# Patient Record
Sex: FEMALE | Race: OTHER | Employment: PART TIME | URBAN - METROPOLITAN AREA
[De-identification: names, ages, dates, MRNs, and addresses within clinical notes are randomized per-mention and may not be internally consistent; named-entity substitution may affect disease eponyms.]

---

## 2017-12-02 ENCOUNTER — APPOINTMENT (OUTPATIENT)
Dept: CT IMAGING | Age: 52
End: 2017-12-02
Payer: COMMERCIAL

## 2017-12-02 ENCOUNTER — HOSPITAL ENCOUNTER (EMERGENCY)
Age: 52
Discharge: HOME | End: 2017-12-02
Attending: EMERGENCY MEDICINE
Payer: COMMERCIAL

## 2017-12-02 VITALS
HEIGHT: 64 IN | WEIGHT: 200 LBS | TEMPERATURE: 97.2 F | DIASTOLIC BLOOD PRESSURE: 67 MMHG | HEART RATE: 64 BPM | BODY MASS INDEX: 34.15 KG/M2 | OXYGEN SATURATION: 100 % | RESPIRATION RATE: 16 BRPM | SYSTOLIC BLOOD PRESSURE: 117 MMHG

## 2017-12-02 DIAGNOSIS — R42 VERTIGO: Primary | ICD-10-CM

## 2017-12-02 LAB
ABSOLUTE EOS #: 0.17 K/UL (ref 0–0.44)
ABSOLUTE IMMATURE GRANULOCYTE: <0.03 K/UL (ref 0–0.3)
ABSOLUTE LYMPH #: 1.71 K/UL (ref 1.1–3.7)
ABSOLUTE MONO #: 0.42 K/UL (ref 0.1–1.2)
ALBUMIN SERPL-MCNC: 3.8 G/DL (ref 3.5–5.2)
ALBUMIN/GLOBULIN RATIO: 1.2 (ref 1–2.5)
ALP BLD-CCNC: 82 U/L (ref 35–104)
ALT SERPL-CCNC: 9 U/L (ref 5–33)
ANION GAP SERPL CALCULATED.3IONS-SCNC: 14 MMOL/L (ref 9–17)
AST SERPL-CCNC: 13 U/L
BASOPHILS # BLD: 1 % (ref 0–2)
BASOPHILS ABSOLUTE: 0.05 K/UL (ref 0–0.2)
BILIRUB SERPL-MCNC: 0.39 MG/DL (ref 0.3–1.2)
BUN BLDV-MCNC: 12 MG/DL (ref 6–20)
BUN/CREAT BLD: ABNORMAL (ref 9–20)
CALCIUM SERPL-MCNC: 8.6 MG/DL (ref 8.6–10.4)
CHLORIDE BLD-SCNC: 104 MMOL/L (ref 98–107)
CO2: 22 MMOL/L (ref 20–31)
CREAT SERPL-MCNC: 0.8 MG/DL (ref 0.5–0.9)
DIFFERENTIAL TYPE: ABNORMAL
EKG ATRIAL RATE: 77 BPM
EKG P AXIS: 69 DEGREES
EKG P-R INTERVAL: 116 MS
EKG Q-T INTERVAL: 418 MS
EKG QRS DURATION: 106 MS
EKG QTC CALCULATION (BAZETT): 473 MS
EKG R AXIS: 11 DEGREES
EKG T AXIS: 27 DEGREES
EKG VENTRICULAR RATE: 77 BPM
EOSINOPHILS RELATIVE PERCENT: 2 % (ref 1–4)
GFR AFRICAN AMERICAN: >60 ML/MIN
GFR NON-AFRICAN AMERICAN: >60 ML/MIN
GFR SERPL CREATININE-BSD FRML MDRD: ABNORMAL ML/MIN/{1.73_M2}
GFR SERPL CREATININE-BSD FRML MDRD: ABNORMAL ML/MIN/{1.73_M2}
GLUCOSE BLD-MCNC: 116 MG/DL (ref 70–99)
HCG QUALITATIVE: NEGATIVE
HCT VFR BLD CALC: 40 % (ref 36.3–47.1)
HEMOGLOBIN: 12.4 G/DL (ref 11.9–15.1)
IMMATURE GRANULOCYTES: 0 %
LYMPHOCYTES # BLD: 25 % (ref 24–43)
MCH RBC QN AUTO: 26.8 PG (ref 25.2–33.5)
MCHC RBC AUTO-ENTMCNC: 31 G/DL (ref 28.4–34.8)
MCV RBC AUTO: 86.4 FL (ref 82.6–102.9)
MONOCYTES # BLD: 6 % (ref 3–12)
PDW BLD-RTO: 14.1 % (ref 11.8–14.4)
PLATELET # BLD: 222 K/UL (ref 138–453)
PLATELET ESTIMATE: ABNORMAL
PMV BLD AUTO: 12.2 FL (ref 8.1–13.5)
POC TROPONIN I: 0 NG/ML (ref 0–0.1)
POC TROPONIN INTERP: NORMAL
POTASSIUM SERPL-SCNC: 3.7 MMOL/L (ref 3.7–5.3)
RBC # BLD: 4.63 M/UL (ref 3.95–5.11)
RBC # BLD: ABNORMAL 10*6/UL
SEG NEUTROPHILS: 66 % (ref 36–65)
SEGMENTED NEUTROPHILS ABSOLUTE COUNT: 4.62 K/UL (ref 1.5–8.1)
SODIUM BLD-SCNC: 140 MMOL/L (ref 135–144)
TOTAL PROTEIN: 7 G/DL (ref 6.4–8.3)
WBC # BLD: 7 K/UL (ref 3.5–11.3)
WBC # BLD: ABNORMAL 10*3/UL

## 2017-12-02 PROCEDURE — 96374 THER/PROPH/DIAG INJ IV PUSH: CPT

## 2017-12-02 PROCEDURE — 6370000000 HC RX 637 (ALT 250 FOR IP): Performed by: STUDENT IN AN ORGANIZED HEALTH CARE EDUCATION/TRAINING PROGRAM

## 2017-12-02 PROCEDURE — 6360000004 HC RX CONTRAST MEDICATION: Performed by: STUDENT IN AN ORGANIZED HEALTH CARE EDUCATION/TRAINING PROGRAM

## 2017-12-02 PROCEDURE — 84703 CHORIONIC GONADOTROPIN ASSAY: CPT

## 2017-12-02 PROCEDURE — 85025 COMPLETE CBC W/AUTO DIFF WBC: CPT

## 2017-12-02 PROCEDURE — 70496 CT ANGIOGRAPHY HEAD: CPT

## 2017-12-02 PROCEDURE — 93005 ELECTROCARDIOGRAM TRACING: CPT

## 2017-12-02 PROCEDURE — 99284 EMERGENCY DEPT VISIT MOD MDM: CPT

## 2017-12-02 PROCEDURE — 80053 COMPREHEN METABOLIC PANEL: CPT

## 2017-12-02 PROCEDURE — 84484 ASSAY OF TROPONIN QUANT: CPT

## 2017-12-02 PROCEDURE — 6360000002 HC RX W HCPCS: Performed by: STUDENT IN AN ORGANIZED HEALTH CARE EDUCATION/TRAINING PROGRAM

## 2017-12-02 RX ORDER — DIAZEPAM 5 MG/1
5 TABLET ORAL ONCE
Status: COMPLETED | OUTPATIENT
Start: 2017-12-02 | End: 2017-12-02

## 2017-12-02 RX ORDER — DIAZEPAM 5 MG/1
5 TABLET ORAL EVERY 8 HOURS PRN
Qty: 20 TABLET | Refills: 0 | Status: SHIPPED | OUTPATIENT
Start: 2017-12-02 | End: 2019-04-26

## 2017-12-02 RX ORDER — MECLIZINE HCL 12.5 MG/1
25 TABLET ORAL ONCE
Status: DISCONTINUED | OUTPATIENT
Start: 2017-12-02 | End: 2017-12-02

## 2017-12-02 RX ORDER — ONDANSETRON 4 MG/1
4 TABLET, FILM COATED ORAL ONCE
Status: DISCONTINUED | OUTPATIENT
Start: 2017-12-02 | End: 2017-12-02

## 2017-12-02 RX ORDER — MECLIZINE HCL 12.5 MG/1
25 TABLET ORAL ONCE
Status: COMPLETED | OUTPATIENT
Start: 2017-12-02 | End: 2017-12-02

## 2017-12-02 RX ORDER — ONDANSETRON 2 MG/ML
4 INJECTION INTRAMUSCULAR; INTRAVENOUS ONCE
Status: COMPLETED | OUTPATIENT
Start: 2017-12-02 | End: 2017-12-02

## 2017-12-02 RX ADMIN — DIAZEPAM 5 MG: 5 TABLET ORAL at 12:24

## 2017-12-02 RX ADMIN — IOVERSOL 90 ML: 741 INJECTION INTRA-ARTERIAL; INTRAVENOUS at 10:32

## 2017-12-02 RX ADMIN — ONDANSETRON 4 MG: 2 INJECTION INTRAMUSCULAR; INTRAVENOUS at 10:58

## 2017-12-02 RX ADMIN — MECLIZINE HCL 25 MG: 12.5 TABLET ORAL at 10:58

## 2017-12-02 ASSESSMENT — ENCOUNTER SYMPTOMS
COUGH: 0
SHORTNESS OF BREATH: 0
NAUSEA: 1
VOMITING: 0
ABDOMINAL PAIN: 0
SORE THROAT: 0
PHOTOPHOBIA: 0
BLOOD IN STOOL: 0
SINUS PRESSURE: 0
SINUS PAIN: 0
DIARRHEA: 0

## 2017-12-02 NOTE — ED PROVIDER NOTES
[facility]  ED  Emergency Department Encounter  Emergency Medicine Resident     Pt Name: Torito Collins  MRN: [id number]  Avanigfjustin 1965  Date of evaluation: 12/2/17  PCP:  Allison Reid MD    [address] [place] [address]       Chief Complaint   Patient presents with    Dizziness     Pt reports dizziness x2 days, reports that she feels like she is going to pass out when standing or lying flat with no pillow. Pt denies LOC    Nausea       HISTORY OF PRESENT ILLNESS  (Location/Symptom, Timing/Onset, Context/Setting, Quality, Duration, Modifying Factors, Severity.)      Torito Collins is a 46 y.o. female who presents with Vertigo-type symptoms over the course the past week. Patient reports that she is been intermittently \"dizzy\" for approximately 15 minutes at a time over the course of the past week, particularly when she moves her head or is walking up stairs. She reports that the symptoms come on suddenly and are associated with nausea. She describes the symptoms as \"lightheadedness\" but denies any presyncopal sensation or spots in her vision. She reports that she feels unsteady on her feet and her vision becomes \"shaky. \"  Today, her symptoms came on suddenly shortly after she woke, and they have been constant ever since, which prompted her to present to the emergency department today. Patient denies any significant headache, ear pain, slurred speech, numbness or tingling, weakness, chest pain, shortness of breath, or any other symptoms. Patient has had similar symptoms in the past on multiple occasions as documented in previous ED visit notes. In 2013, CTA revealed basilar artery stenosis, However subsequent imaging with angiography showed no stenosis. Patient follows with neurology throughout 2013 in 2014 and took [facility] for her symptoms. She was symptom-free between 2014 in 2016.   She most recently presented with similar symptoms in April 2016, with negative CTA at that time; Patient was admitted for further evaluation, however she left against medical advice once her symptoms had resolved. Patient reports she has been symptom-free since that time. Patient with history of Factor V Leiden. She reports she was on Coumadin for approximately one year in the past, however she was told by her PCP to stop taking it. She now takes one 81 mg aspirin daily. Patient also has history of iron deficiency anemia and reports that she has not taken her iron in over one year. PAST MEDICAL / SURGICAL / SOCIAL / FAMILY HISTORY      has a past medical history of Arthritis; Basilar artery stenosis; GERD (gastroesophageal reflux disease); Heterozygous factor V Leiden mutation ([facility] [address] Hyperlipidemia; Iron deficiency anemia; Migraine; Obesity (BMI 30-39.9); Panic disorder; and Warfarin anticoagulation. has a past surgical history that includes Cholecystectomy; Tubal ligation; and Carpal tunnel release. Social History     Social History    Marital status: Single     Spouse name: N/A    Number of children: N/A    Years of education: N/A     Occupational History    car industry      Social History Main Topics    Smoking status: Former Smoker    Smokeless tobacco: Never Used    Alcohol use Yes      Comment: rarely    Drug use: No    Sexual activity: Not on file     Other Topics Concern    Not on file     Social History Narrative    No narrative on file       Family History   Problem Relation Age of Onset    Osteoarthritis Mother     Other Mother      colostomy    Cancer Maternal Grandmother      Lung    Diabetes Maternal Grandfather        Allergies:  Review of patient's allergies indicates no known allergies. Home Medications:  Prior to Admission medications    Medication Sig Start Date End Date Taking? Authorizing Provider   diazepam (VALIUM) 5 MG tablet Take 1 tablet by mouth every 8 hours as needed (vertigo)  Do not drive or operate heavy machinery while on this medication. . 12/2/17  Yes Nicholas Mckenzie MD   PARoxetine (PAXIL) 10 MG tablet Take 1 tablet by mouth daily 4/19/16   Barby Harris MD   aspirin 81 MG tablet Take 1 tablet by mouth daily. 5/14/13   Som Kwon MD       REVIEW OF SYSTEMS    (2-9 systems for level 4, 10 or more for level 5)      Review of Systems   Constitutional: Negative for chills, fatigue and fever. HENT: Negative for congestion, dental problem, ear pain, hearing loss, sinus pain, sinus pressure, sore throat and tinnitus. Eyes: Negative for photophobia and visual disturbance. Respiratory: Negative for cough and shortness of breath. Cardiovascular: Negative for chest pain. Gastrointestinal: Positive for nausea. Negative for abdominal pain, blood in stool, diarrhea and vomiting. Genitourinary: Negative for dysuria and hematuria. Musculoskeletal: Positive for gait problem (intermittent, only with vertigo symptoms). Negative for neck pain and neck stiffness. Skin: Negative for pallor and rash. Neurological: Positive for dizziness. Negative for tremors, seizures, syncope, facial asymmetry, speech difficulty, weakness, light-headedness, numbness and headaches. PHYSICAL EXAM   (up to 7 for level 4, 8 or more for level 5)      INITIAL VITALS:   /67   Pulse 64   Temp 97.2 °F (36.2 °C) (Oral)   Resp 16   Ht 5' 4\" (1.626 m)   Wt 200 lb (90.7 kg)   LMP 11/25/2017   SpO2 100%   BMI 34.33 kg/m²     Physical Exam   Gen. Appearance: patient appears well, nondistressed. HEENT: head atraumatic, normocephalic. Pupils equal and reactive to light. Normal tympanic membranes bilaterally. Oropharynx clear and moist.  Neck: Supple, normal range of motion. No lymphadenopathy. Pulmonary: Lungs clear to auscultation bilaterally. Equal air entry right left. Cardiovascular:  Heart sounds normal, no murmurs. Peripheral pulses strong, regular, equal.   Abdomen: Soft, nontender, nondistended. Bowel sounds normal. No palpable masses. Neurology: GCS 15. Oriented to person place and time.    -Cranial nerve exam:                        CN III, IV, VI: EOM normal                        CN V: facial sensation normal                        CN VII: normal facial expressions - symmetrical and normal eyebrow raise, eye closure, smile                        CN IX, X: uvula midline, symmetrical palate motion                        CN XI: normal symmetrical shoulder raise                        CN XII: normal tongue movement; no deviation  -Fatiguable rapid horizontal nystagmus on leftward gaze. No rotary or vertical nystagmus.  -Normal finger-to-nose; no past-pointing  -No dysdiadochokinesis   -Peripheral nerve exam: Normal tone and power in all 4 extremities. No sensory deficits. DIFFERENTIAL  DIAGNOSIS     PLAN (LABS / IMAGING / EKG):  Orders Placed This Encounter   Procedures    CTA HEAD W CONTRAST    CBC Auto Differential    COMPREHENSIVE METABOLIC PANEL    HCG Qualitative, Serum    Orthostatic blood pressure and pulse    POCT troponin    EKG 12 Lead       MEDICATIONS ORDERED:  Orders Placed This Encounter   Medications    DISCONTD: meclizine (ANTIVERT) tablet 25 mg    DISCONTD: ondansetron (ZOFRAN) tablet 4 mg    ioversol (OPTIRAY) 74 % injection 90 mL    meclizine (ANTIVERT) tablet 25 mg    ondansetron (ZOFRAN) injection 4 mg    diazepam (VALIUM) tablet 5 mg    diazepam (VALIUM) 5 MG tablet     Sig: Take 1 tablet by mouth every 8 hours as needed (vertigo)  Do not drive or operate heavy machinery while on this medication. .     Dispense:  20 tablet     Refill:  0       DDX: BPPV, basal artery stenosis, other central or peripheral causes of vertigo, orthostatic hypotension, anemia      DIAGNOSTIC RESULTS / EMERGENCY DEPARTMENT COURSE / MDM     LABS:  Results for orders placed or performed during the hospital encounter of 12/02/17   CBC Auto Differential   Result Value Ref Range    WBC 7.0 3.5 - 11.3 k/uL    RBC 4.63 3.95 -

## 2017-12-02 NOTE — ED NOTES
Pt arrived to ED alert and oriented x4. Pt reports dizziness and nausea x2 days. Pt reports that when she woke up this AM and stretched she felt as if she was going to pass out, denies LOC. Pt reports that she intermittently fells as if she is going to pass out when standing or lying flat with no pillow. Pt reports that she used to take Antivert for her dizziness but reports that she has not taken it \"in years\". Pt ambulates with steady gait. Pt reports nausea d/t the dizziness, denies abdominal pain, v/d/c. Pt placed on cardiac monitor, continuous pulse ox, and BP cuff. EKG in process. RR even and unlabored. NAD noted. Will continue to monitor.       Nay Gomes RN  12/02/17 9600

## 2017-12-04 ENCOUNTER — OFFICE VISIT (OUTPATIENT)
Dept: INTERNAL MEDICINE | Age: 52
End: 2017-12-04
Payer: COMMERCIAL

## 2017-12-04 VITALS
SYSTOLIC BLOOD PRESSURE: 140 MMHG | HEIGHT: 64 IN | DIASTOLIC BLOOD PRESSURE: 86 MMHG | HEART RATE: 86 BPM | BODY MASS INDEX: 36.88 KG/M2 | WEIGHT: 216 LBS

## 2017-12-04 DIAGNOSIS — Z12.39 BREAST CANCER SCREENING: ICD-10-CM

## 2017-12-04 DIAGNOSIS — F41.9 ANXIETY: Chronic | ICD-10-CM

## 2017-12-04 DIAGNOSIS — R42 VERTIGO: ICD-10-CM

## 2017-12-04 DIAGNOSIS — M72.2 PLANTAR FASCIITIS: ICD-10-CM

## 2017-12-04 DIAGNOSIS — Z00.00 HEALTHCARE MAINTENANCE: Primary | ICD-10-CM

## 2017-12-04 DIAGNOSIS — E66.9 OBESITY (BMI 30-39.9): ICD-10-CM

## 2017-12-04 PROCEDURE — G8427 DOCREV CUR MEDS BY ELIG CLIN: HCPCS | Performed by: HOSPITALIST

## 2017-12-04 PROCEDURE — 1036F TOBACCO NON-USER: CPT | Performed by: HOSPITALIST

## 2017-12-04 PROCEDURE — G8417 CALC BMI ABV UP PARAM F/U: HCPCS | Performed by: HOSPITALIST

## 2017-12-04 PROCEDURE — 3014F SCREEN MAMMO DOC REV: CPT | Performed by: HOSPITALIST

## 2017-12-04 PROCEDURE — 3017F COLORECTAL CA SCREEN DOC REV: CPT | Performed by: HOSPITALIST

## 2017-12-04 PROCEDURE — 99213 OFFICE O/P EST LOW 20 MIN: CPT | Performed by: HOSPITALIST

## 2017-12-04 PROCEDURE — G8484 FLU IMMUNIZE NO ADMIN: HCPCS | Performed by: HOSPITALIST

## 2017-12-04 NOTE — PROGRESS NOTES
Attending Physician Statement  I have discussed the care of Grisel Baird, including pertinent history and exam findings with the resident. I have reviewed the key elements of all parts of the encounter and discussed them with the resident. Added history includes hx of vertigo with recurrent episodes. She has sx suggestive of plantar fasciitis with heel pain. She has anxiety and this might relate to her vertigo as well. She is willing to see Dr. Daphne Guevara to discuss this as she does not want to be on meds. Added exam findings include BP is ok ears are clear and balance is ok and no nyustagmus. I agree with the assessment, and status of the problem list as documented. The plan and orders should include continue the Valium prn and exercises for plantar fasciitis and this was also documented by the resident. I agree with the referral to ENT. The medication list was reviewed with the resident and is up to date. The return visit should be in 6 weeks .     Tk Tejeda

## 2017-12-04 NOTE — PROGRESS NOTES
Visit Information    Have you changed or started any medications since your last visit including any over-the-counter medicines, vitamins, or herbal medicines? no   Have you stopped taking any of your medications? Is so, why? -  no  Are you having any side effects from any of your medications? - no    Have you seen any other physician or provider since your last visit? yes    Have you had any other diagnostic tests since your last visit? yes    Have you been seen in the emergency room and/or had an admission in a hospital since we last saw you?  yes - 12/2   Have you had your routine dental cleaning in the past 6 months?  no     Do you have an active MyChart account? If no, what is the barrier?   No: pt declined     Patient Care Team:  Caridad Segal MD as PCP - General (Internal Medicine)    Medical History Review  Past Medical, Family, and Social History reviewed and does contribute to the patient presenting condition    Health Maintenance   Topic Date Due    Hepatitis C screen  1965    HIV screen  05/24/1980    DTaP/Tdap/Td vaccine (1 - Tdap) 05/24/1984    Cervical cancer screen  05/24/1986    Breast cancer screen  05/24/2015    Colon cancer screen colonoscopy  05/24/2015    Flu vaccine (1) 09/01/2017    Diabetes screen  04/19/2019    Lipid screen  04/20/2021

## 2017-12-04 NOTE — PROGRESS NOTES
[facility]  [facility] [address] [facility]  [address] [facility] [address] [facility]  Dept: [phone]  Dept Fax: [phone]    Office Progress/Follow Up Note  Date of patient's visit: 12/4/2017  Patient's Name:  Mirna Ellington YOB: 1965            Patient Care Team:  Berenice Figueroa MD as PCP - General (Internal Medicine)    REASON FOR VISIT: Routine outpatient follow up    HISTORY OF PRESENT ILLNESS:      Chief Complaint   Patient presents with   [facility] ED Follow-up     12/2     Dizziness     all day 12/3, even to just move head around, very naseous feels like motion sickness     Referral - General     would like referral to ENT - pending hears ringing in ear, ear feels muffled     Health Maintenance     mammogram, hiv and hep c labs pending        History was obtained from: patient. Mirna Ellington is a 46 y.o. is here for a follow up. Patient was recently at [facility]. Vs ER for vertigo symptoms. She use to have BPPV and was on meclizine but was stopped as her symptoms improved. Patient went to ER on 12/2 for complaints of dizziness associated with head position changes, nausea, and per ER notes had fatiguable nystagmus. She states on Saturday her symptoms were very severe more so than before. She use to be on meclizine in 2013 and 2014. In ER she received meclizine with little improvement and then was started on valium which she states she took last night and helped with her symptoms. Today she states she is feeling better but is feeling a little nauseous. She has a PMH of possible basilar artery stenosis and was on coumadin but was stopped and is currently on aspirin daily. Orthostatics done today were negative. Patient requests to follow with a ENT specialist. She does not wish to be on too many medications as it may effect her work. She also has a history of anxiety and was previously on xanax and switched to paxil which she has not taken as she does not wish to take it. She would prefer to follow up with someone. She states she has also been having heel pain for almost a year. Denies trauma. Seems pain is worse when she works and is standing still for long periods of times with some relief with activity. More so on left foot. Patient Active Problem List   Diagnosis    Iron deficiency anemia    Heterozygous factor V Leiden mutation ([facility] [address]    Obesity (BMI 30-39. 9)    Warfarin anticoagulation    GERD (gastroesophageal reflux disease)    Anxiety    Dyslipidemia (high LDL; low HDL)    Vertigo         Health Maintenance Due   Topic Date Due    Hepatitis C screen  1965    HIV screen  05/24/1980    DTaP/Tdap/Td vaccine (1 - Tdap) 05/24/1984    Cervical cancer screen  05/24/1986    Breast cancer screen  05/24/2015    Colon cancer screen colonoscopy  05/24/2015    Flu vaccine (1) 09/01/2017         No Known Allergies      MEDICATIONS:      Current Outpatient Prescriptions   Medication Sig Dispense Refill    diazepam (VALIUM) 5 MG tablet Take 1 tablet by mouth every 8 hours as needed (vertigo)  Do not drive or operate heavy machinery while on this medication. . 20 tablet 0    aspirin 81 MG tablet Take 1 tablet by mouth daily. 30 tablet 3    PARoxetine (PAXIL) 10 MG tablet Take 1 tablet by mouth daily 30 tablet 1     No current facility-administered medications for this visit. SOCIAL HISTORY    Reviewed and no change from previous record. Aric Zimmer  reports that she has quit smoking.  She has never used smokeless tobacco.    FAMILY HISTORY:    Reviewed and No change from previous visit    REVIEW OF SYSTEMS:    CONSTITUTIONAL: Denies: fever, chills  PSYCH: Denies: anxiety, depression  ALLERGIES: Denies: urticaria  EYES: Denies: blurry vision, decreased vision, photophobia  ENT: Denies: sore throat, nasal congestion  CARDIOVASCULAR: Denies: chest pain, dyspnea on exertion  RESPIRATORY: Denies: cough, hemoptysis, shortness of breath  GI: Denies: Denies: abdominal pain, flank pain  : Denies: Denies: dysuria, frequency/urgency  NEURO: Denies: dizzy/vertigo, headache  MUSCULOSKELETAL: Denies: back pain, joint pain  SKIN: Denies: rash, itching    PHYSICAL EXAM:      Vitals:    12/04/17 1128 12/04/17 1129 12/04/17 1130   BP: 130/76 126/82 (!) 140/86   Site: Left Arm Left Arm Left Arm   Position: Supine Sitting Standing   Cuff Size: Medium Adult Medium Adult Medium Adult   Pulse: 76 76 86   Weight: 216 lb (98 kg)     Height: 5' 4\" (1.626 m)       BP Readings from Last 3 Encounters:   12/04/17 (!) 140/86   12/02/17 117/67   04/19/16 130/77      General appearance - alert, well appearing, and in no distress  Eyes - pupils equal and reactive, extraocular eye movements intact  Ears - bilateral TM's and external ear canals normal  Chest - clear to auscultation, no wheezes, rales or rhonchi, symmetric air entry  Heart - normal rate, regular rhythm, normal S1, S2, no murmurs, rubs, clicks or gallops  Abdomen - soft, nontender, nondistended, no masses or organomegaly  Neurological - alert, oriented, normal speech, no focal findings or movement disorder noted  Musculoskeletal - no joint tenderness, deformity or swelling  Extremities - peripheral pulses normal, no pedal edema, no clubbing or cyanosis    LABORATORY FINDINGS:    CBC:  Lab Results   Component Value Date    WBC 7.0 12/02/2017    HGB 12.4 12/02/2017     12/02/2017       BMP:    Lab Results   Component Value Date     12/02/2017    K 3.7 12/02/2017     12/02/2017    CO2 22 12/02/2017    BUN 12 12/02/2017    CREATININE 0.80 12/02/2017    GLUCOSE 116 12/02/2017       HEMOGLOBIN A1C:   Lab Results   Component Value Date    LABA1C 5.3 04/19/2016       FASTING LIPID PANEL:  Lab Results   Component Value Date    CHOL 142 04/20/2016    HDL 44 04/20/2016    TRIG 70 04/20/2016       ASSESSMENT AND PLAN:    Anna Gordon was seen today for ed follow-up, dizziness, referral - general and health maintenance.     Diagnoses and all

## 2017-12-04 NOTE — PATIENT INSTRUCTIONS
Patient Education        Plantar Fasciitis: Care Instructions  Your Care Instructions    Plantar fasciitis is pain and inflammation of the plantar fascia, the tissue at the bottom of your foot that connects the heel bone to the toes. The plantar fascia also supports the arch. If you strain the plantar fascia, it can develop small tears and cause heel pain when you stand or walk. Plantar fasciitis can be caused by running or other sports. It also may occur in people who are overweight or who have high arches or flat feet. You may get plantar fasciitis if you walk or stand for long periods, or have a tight Achilles tendon or calf muscles. You can improve your foot pain with rest and other care at home. It might take a few weeks to a few months for your foot to heal completely. Follow-up care is a key part of your treatment and safety. Be sure to make and go to all appointments, and call your doctor if you are having problems. It's also a good idea to know your test results and keep a list of the medicines you take. How can you care for yourself at home? · Rest your feet often. Reduce your activity to a level that lets you avoid pain. If possible, do not run or walk on hard surfaces. · Take pain medicines exactly as directed. ¨ If the doctor gave you a prescription medicine for pain, take it as prescribed. ¨ If you are not taking a prescription pain medicine, take an over-the-counter anti-inflammatory medicine for pain and swelling, such as ibuprofen (Advil, Motrin) or naproxen (Aleve). Read and follow all instructions on the label. · Use ice massage to help with pain and swelling. You can use an ice cube or an ice cup several times a day. To make an ice cup, fill a paper cup with water and freeze it. Cut off the top of the cup until a half-inch of ice shows. Hold onto the remaining paper to use the cup. Rub the ice in small circles over the area for 5 to 7 minutes.   · Contrast baths, which alternate hot and [employer] disclaims any warranty or liability for your use of this information. Order for mammogram faxed to scheduling, they will contact patient with an appt original order given to patient along with scheduling phone number   Referral to    [facility]  was placed, summary of care printed and faxed to office, phone numbers given to pt, they will contact office for an appt   TV    Follow-up appointment scheduled for   01/25/17  , AVS given to patient.

## 2017-12-27 ENCOUNTER — HOSPITAL ENCOUNTER (OUTPATIENT)
Dept: GENERAL RADIOLOGY | Age: 52
Discharge: HOME | End: 2017-12-27
Payer: COMMERCIAL

## 2017-12-27 ENCOUNTER — HOSPITAL ENCOUNTER (OUTPATIENT)
Age: 52
Discharge: HOME | End: 2017-12-27
Payer: COMMERCIAL

## 2017-12-27 ENCOUNTER — OFFICE VISIT (OUTPATIENT)
Dept: PODIATRY | Age: 52
End: 2017-12-27
Payer: COMMERCIAL

## 2017-12-27 VITALS
BODY MASS INDEX: 36.7 KG/M2 | SYSTOLIC BLOOD PRESSURE: 111 MMHG | WEIGHT: 215 LBS | HEIGHT: 64 IN | DIASTOLIC BLOOD PRESSURE: 75 MMHG | TEMPERATURE: 97.6 F | HEART RATE: 68 BPM

## 2017-12-27 DIAGNOSIS — M25.572 CHRONIC PAIN OF LEFT ANKLE: ICD-10-CM

## 2017-12-27 DIAGNOSIS — G89.29 CHRONIC PAIN OF LEFT ANKLE: ICD-10-CM

## 2017-12-27 DIAGNOSIS — M72.2 PLANTAR FASCIITIS: Primary | ICD-10-CM

## 2017-12-27 DIAGNOSIS — M72.2 PLANTAR FASCIITIS OF RIGHT FOOT: ICD-10-CM

## 2017-12-27 DIAGNOSIS — M72.2 PLANTAR FASCIITIS: ICD-10-CM

## 2017-12-27 PROCEDURE — 3014F SCREEN MAMMO DOC REV: CPT | Performed by: STUDENT IN AN ORGANIZED HEALTH CARE EDUCATION/TRAINING PROGRAM

## 2017-12-27 PROCEDURE — 3017F COLORECTAL CA SCREEN DOC REV: CPT | Performed by: STUDENT IN AN ORGANIZED HEALTH CARE EDUCATION/TRAINING PROGRAM

## 2017-12-27 PROCEDURE — 1036F TOBACCO NON-USER: CPT | Performed by: STUDENT IN AN ORGANIZED HEALTH CARE EDUCATION/TRAINING PROGRAM

## 2017-12-27 PROCEDURE — G8417 CALC BMI ABV UP PARAM F/U: HCPCS | Performed by: STUDENT IN AN ORGANIZED HEALTH CARE EDUCATION/TRAINING PROGRAM

## 2017-12-27 PROCEDURE — 99203 OFFICE O/P NEW LOW 30 MIN: CPT | Performed by: STUDENT IN AN ORGANIZED HEALTH CARE EDUCATION/TRAINING PROGRAM

## 2017-12-27 PROCEDURE — 73630 X-RAY EXAM OF FOOT: CPT

## 2017-12-27 PROCEDURE — 73610 X-RAY EXAM OF ANKLE: CPT

## 2017-12-27 PROCEDURE — G8427 DOCREV CUR MEDS BY ELIG CLIN: HCPCS | Performed by: STUDENT IN AN ORGANIZED HEALTH CARE EDUCATION/TRAINING PROGRAM

## 2017-12-27 PROCEDURE — G8484 FLU IMMUNIZE NO ADMIN: HCPCS | Performed by: STUDENT IN AN ORGANIZED HEALTH CARE EDUCATION/TRAINING PROGRAM

## 2017-12-27 PROCEDURE — 29540 STRAPPING ANKLE &/FOOT: CPT | Performed by: STUDENT IN AN ORGANIZED HEALTH CARE EDUCATION/TRAINING PROGRAM

## 2017-12-27 RX ORDER — IBUPROFEN 800 MG/1
800 TABLET ORAL EVERY 6 HOURS PRN
Qty: 60 TABLET | Refills: 0 | Status: SHIPPED | OUTPATIENT
Start: 2017-12-27 | End: 2019-04-26

## 2017-12-27 NOTE — PROGRESS NOTES
Patient instructed to remove shoes and socks, instructed to sit in exam chair. Current PCP name is Fanny Gabriel and date of last visit 11/201. Do you have a follow up visit scheduled?   Yes   02/2018

## 2017-12-27 NOTE — PATIENT INSTRUCTIONS
Buy PowerStep Original Insoles from [employer], remove original insole  Wear shoes inside the home. Stretch before getting out of bed with a towel. Obtain xrays before next visit. Patient Instructions: Plantar Fasciitis    Plantar Fasciitis is inflammation of the long fibrous band on the bottom of the foot (plantar fascia), especially in the area of its attachment to the heel bone (calcaneus). The plantar fascia is also intimately related to the Achilles tendon and therefore stretching of the calf muscles is also important for treatment. 1. Stretching: Perform 3-4 times daily, 2-3 minutes per side      -Before getting out of bed, place a towel around the ball of your foot and       pull back, holding for 30 seconds. Repeat with other foot.      -Place a tennis ball on the floor. Roll the tennis ball under your foot for      10-15 minutes. Repeat with other foot. -Perform calf stretches: stand facing a wall with your hands on the wall,      place one leg a step behind the other. Keeping your back heel on the      floor, bend your front knee (lean into the wall), holding for 30 seconds. Repeat with other leg. 2. Icing: Perform 2-3 times daily      -Place a 12 oz plastic water bottle in the freezer. Once frozen, remove     and roll the bottle under your foot for 10-15 min. 3. Anti-inflammatory Medication      -Begin daily regimen of anti-inflammatory medication (Ibuprofen,  Naproxen, Naprosyn) as discussed during your visit.

## 2017-12-27 NOTE — PROGRESS NOTES
Results   Component Value Date    LABA1C 5.3 04/19/2016       Physical Exam:  General:  Alert and oriented x3. In no acute distress. Lower Extremity Physical Exam:    Vascular: DP and PT pulses are palpable +2/4, Bilateral. CFT <3 seconds to all digits, Bilateral.  No edema, Bilateral.  Hair growth is present to the level of the digits, Bilateral.     Neuro: Saph/sural/SP/DP/plantar SILT. Musculoskeletal: EHL/FHL/GS/TA gross motor intact. Pain on palpation of the medial tubercle of the calcaneus, left. Pain on palpation of the distal fibula, left. Gross deformity is not present, Bilateral.     Dermatologic: Skin is warm, dry, and supple. No open lesions, Bilateral.  Interdigital maceration absent, Bilateral.  Nails 1-10 are normal morphology. Biomechanical Exam:   Right foot: 1st MPJ: 30L 45U  Right foot: 1st Ray: 5D 2P       Right foot: Ankle DF knee extended: -6  Right foot: Ankle DF knee flexed: 4    Left foot: 1st MPJ: 30L 45U  Left foot: 1st Ray: 4D 2P  Left foot: Ankle DF knee extended: -6  Left foot: Ankle DF knee flexed: 4    Gait analysis: Head level, left shoulder lower, right hip elevated, patellae forward, mild collapse of medial arch with weightbearing, resupination of STJ with swing phase, equal purchase of digits. .     Assessment    Gregoria Mims is a 46 y.o. female with:   1. Plantar fasciitis  XR FOOT LEFT (MIN 3 VIEWS)    ibuprofen (ADVIL;MOTRIN) 800 MG tablet    MA STRAPPING; ANKLE &/OR FOOT   2. Chronic pain of left ankle  XR ANKLE LEFT (MIN 3 VIEWS)   3. Plantar fasciitis of right foot  MA STRAPPING; ANKLE &/OR FOOT     Plan      - Patient examined and evaluated  - Diagnosis and treatment options discussed in detail  - Ordered xrays of left foot and ankle. - Rx Ibuprofen 800mg x2 weeks   - Low dye Taping jeremy   - Stretching exercises as instructed   - Buy and wear powersteps in shoes at all times  - Discussed possibility of needing custom orthotics or an injection in the future. - Patient to RTC in 2 month(s)  - Please, call the office with any questions or concerns     Electronically signed by Sarahy Carrera DPM on 12/27/2017 at 2:22 PM

## 2018-01-17 ENCOUNTER — OFFICE VISIT (OUTPATIENT)
Dept: OTOLARYNGOLOGY | Age: 53
End: 2018-01-17
Payer: COMMERCIAL

## 2018-01-17 VITALS
HEART RATE: 74 BPM | WEIGHT: 214.95 LBS | BODY MASS INDEX: 36.7 KG/M2 | SYSTOLIC BLOOD PRESSURE: 128 MMHG | HEIGHT: 64 IN | DIASTOLIC BLOOD PRESSURE: 68 MMHG

## 2018-01-17 DIAGNOSIS — H81.11 BPPV (BENIGN PAROXYSMAL POSITIONAL VERTIGO), RIGHT: Primary | ICD-10-CM

## 2018-01-17 DIAGNOSIS — H93.13 TINNITUS OF BOTH EARS: ICD-10-CM

## 2018-01-17 PROCEDURE — 99203 OFFICE O/P NEW LOW 30 MIN: CPT | Performed by: OTOLARYNGOLOGY

## 2018-01-23 ENCOUNTER — HOSPITAL ENCOUNTER (OUTPATIENT)
Dept: PHYSICAL THERAPY | Age: 53
Setting detail: THERAPIES SERIES
Discharge: HOME | End: 2018-01-23
Payer: COMMERCIAL

## 2018-01-23 PROCEDURE — 97162 PT EVAL MOD COMPLEX 30 MIN: CPT

## 2018-01-23 PROCEDURE — 97530 THERAPEUTIC ACTIVITIES: CPT

## 2018-01-29 ENCOUNTER — HOSPITAL ENCOUNTER (OUTPATIENT)
Dept: PHYSICAL THERAPY | Age: 53
Setting detail: THERAPIES SERIES
Discharge: HOME | End: 2018-01-29
Payer: COMMERCIAL

## 2018-01-29 PROCEDURE — 95992 CANALITH REPOSITIONING PROC: CPT

## 2018-01-29 PROCEDURE — 97530 THERAPEUTIC ACTIVITIES: CPT

## 2018-01-29 ASSESSMENT — PAIN SCALES - GENERAL: PAINLEVEL_OUTOF10: 3

## 2018-01-29 ASSESSMENT — PAIN DESCRIPTION - LOCATION: LOCATION: NECK

## 2018-01-29 NOTE — PROGRESS NOTES
Physical Therapy  INITIAL [facility]      Date: 18  Patient Name: Devon Blue         MRN: [id number]  Account: [de-identified]   : 1965  (46 y.o.) Gender: female      18 1252   General   Additional Pertinent Hx PATIENT REPORTS A SEVERAL YEAR HISTORY OF SYMPTOMS OF UNKNOWN ORIGIN. SHE STATES IN THE PAST SHE WAS DIAGNOSISED WITH BPPV BUT HAS NEVER HAD TREATMENT. THIS MOST RECENT BOUT BEGAN 2017 AND SHE WAS SEEN IN ED.  SHE HAD REPEAT CT SCAN OF HER HEAD WHICH WAS NEGATIVE. SHE FOLLOWED UP WITH ENT AND WAS REFERRED FOR VESTIBULAR REHAB. IMAGING 3/2013 INDICATED POSSIBLE BASILAR ARTERY STENOSIS. REPEAT IMAGING 2013, 2013, 2016 AND 2017 WERE UNREMARKABLE. Past medical history of Arthritis; Basilar artery stenosis; GERD; Heterozygous factor V Leiden mutation; Hyperlipidemia; Iron deficiency anemia; Migraine; Obesity (BMI 30-39.9); Panic disorder; and Warfarin anticoagulation. NO SIGNIFICANT PAST SURGICAL HISTORY   Family / Caregiver Present NO   Referring Practitioner [facility]   Referral Date  18   Diagnosis BPPV   ICD-10:H81.11   PT Visit Information   PT Insurance Information [place]   Total # of Visits to Date 1   Subjective   Subjective PATIENT REPORTS SHE NOTES A SPINNING SENSATION WHEN SHE LIES ON HER RIGHT SIDE LASTING LESS THAN 2 MINUTES. SHE GET ASSOCIATED NAUSEA. Pain Screening   Patient Currently in Pain CERVICAL PAIN \"STIFFNESS\" RATES AT 7/10 ON PAIN SCALE   Vision   Vision GLASSES   Hearing   Hearing PATIENT REPORTS NEW ONSET TINNITUS B EARS   IADL History   Active  Yes   Occupation Full time employment  [employer]   Observation/Palpation   Posture Good   Observation HEAD/NECK IS LATERALLY FLEXED TO THE LEFT.    Spine   Cervical AROM: FLEXION 100%, EXTENSION 50%,   LATERAL FLEXION BOTH 50%, ROTATION BOTH 75%     PATIENT REPORTS CERVICAL PAIN AND STIFFNESS WITH ROM,   DENIES VERTIGO   Special Tests DIZZINESS Assessment RIGHT POSTERIOR CANAL BPPV   Treatment Diagnosis POSITIONAL VERTIGO, ANXIETY   Prognosis Good   Decision Making Medium Complexity   History AS ABOVE, INCLUDES PREVIOUS BOUTS OF VERTIGO, ANXIETY, PANIC ATTACKS   Patient Education RESULTS OF CLINICAL TESTS, POC, MAYBE ABLE TO TOLERATE VERTIGO IF PREMEDICATED PRIOR TO THERAPY NEXT SESSION. ENCOURAGED TO WEAR EAR PROTECTION AT WORK. ISSUED PATIENT EDUCATION LITERATURE RE BPPV   Barriers to Learning NO   Activity Tolerance   Comments VERY ANXIOUS, TEARFUL, UNABLE TO TOLERATE POSITION TEST   Plan   Plan Plan of care initiated   Comment DISCUSSED WITH PATIENT RETURNING WITH FAMILY MEMBER AND TAKING HER MEDICATION FOR VERTIGO PRIOR TO TREATMENT.      Frequency and duration of tx   Days 1   Weeks 3   Therapy Session   TIME IN 1245, TIME OUT 1355  Treatment Charges: Minutes Units   []  Ultrasound     []  Electrical-Stim     []  Iontophoresis     []  Traction     []  Massage       [x]  Eval 45 1   []  Gait     []  Ther Exercise       []  Manual Therapy       [x]  Ther Activities 20  1    []  Aquatics     []  Neuro Re-Ed       []  Other       Total Treatment Time: 65 2    Electronically signed by Merari Espino PT on 1/29/2018 at 8:50 AM

## 2018-01-30 NOTE — PROGRESS NOTES
Minutes Units   []  Ultrasound     []  Electrical-Stim     []  Iontophoresis     []  Traction     []  Massage       []  Eval     []  Gait     []  Ther Exercise       []  Manual Therapy       [x]  Ther Activities 25  2    []  Aquatics     []  Neuro Re-Ed       [x]  Other:CRM 20  1    Total Treatment Time: 39  3   Electronically signed by Sammy Robbins PT on 1/30/2018 at 10:53 AM]

## 2018-01-31 ENCOUNTER — OFFICE VISIT (OUTPATIENT)
Dept: INTERNAL MEDICINE | Age: 53
End: 2018-01-31
Payer: COMMERCIAL

## 2018-01-31 VITALS
SYSTOLIC BLOOD PRESSURE: 113 MMHG | BODY MASS INDEX: 37.05 KG/M2 | TEMPERATURE: 98.6 F | HEART RATE: 78 BPM | HEIGHT: 64 IN | WEIGHT: 217 LBS | OXYGEN SATURATION: 97 % | DIASTOLIC BLOOD PRESSURE: 71 MMHG

## 2018-01-31 DIAGNOSIS — J06.9 VIRAL UPPER RESPIRATORY TRACT INFECTION: Primary | ICD-10-CM

## 2018-01-31 LAB
INFLUENZA A ANTIBODY: NEGATIVE
INFLUENZA B ANTIBODY: NEGATIVE

## 2018-01-31 PROCEDURE — 3017F COLORECTAL CA SCREEN DOC REV: CPT | Performed by: INTERNAL MEDICINE

## 2018-01-31 PROCEDURE — 1036F TOBACCO NON-USER: CPT | Performed by: INTERNAL MEDICINE

## 2018-01-31 PROCEDURE — G8417 CALC BMI ABV UP PARAM F/U: HCPCS | Performed by: INTERNAL MEDICINE

## 2018-01-31 PROCEDURE — G8484 FLU IMMUNIZE NO ADMIN: HCPCS | Performed by: INTERNAL MEDICINE

## 2018-01-31 PROCEDURE — 87804 INFLUENZA ASSAY W/OPTIC: CPT | Performed by: INTERNAL MEDICINE

## 2018-01-31 PROCEDURE — G8427 DOCREV CUR MEDS BY ELIG CLIN: HCPCS | Performed by: INTERNAL MEDICINE

## 2018-01-31 PROCEDURE — 3014F SCREEN MAMMO DOC REV: CPT | Performed by: INTERNAL MEDICINE

## 2018-01-31 PROCEDURE — 99213 OFFICE O/P EST LOW 20 MIN: CPT | Performed by: INTERNAL MEDICINE

## 2018-01-31 ASSESSMENT — PATIENT HEALTH QUESTIONNAIRE - PHQ9
SUM OF ALL RESPONSES TO PHQ9 QUESTIONS 1 & 2: 0
1. LITTLE INTEREST OR PLEASURE IN DOING THINGS: 0
SUM OF ALL RESPONSES TO PHQ QUESTIONS 1-9: 0
2. FEELING DOWN, DEPRESSED OR HOPELESS: 0

## 2018-02-05 ENCOUNTER — HOSPITAL ENCOUNTER (OUTPATIENT)
Dept: PHYSICAL THERAPY | Age: 53
Setting detail: THERAPIES SERIES
Discharge: HOME | End: 2018-02-05
Payer: COMMERCIAL

## 2018-02-07 ENCOUNTER — OFFICE VISIT (OUTPATIENT)
Dept: PODIATRY | Age: 53
End: 2018-02-07
Payer: COMMERCIAL

## 2018-02-07 VITALS
WEIGHT: 212 LBS | HEART RATE: 75 BPM | BODY MASS INDEX: 36.19 KG/M2 | DIASTOLIC BLOOD PRESSURE: 77 MMHG | SYSTOLIC BLOOD PRESSURE: 123 MMHG | HEIGHT: 64 IN

## 2018-02-07 DIAGNOSIS — M79.672 FOOT PAIN, BILATERAL: ICD-10-CM

## 2018-02-07 DIAGNOSIS — M79.671 FOOT PAIN, BILATERAL: ICD-10-CM

## 2018-02-07 DIAGNOSIS — L84 HELOMA MOLLE: ICD-10-CM

## 2018-02-07 DIAGNOSIS — M72.2 PLANTAR FASCIITIS: Primary | ICD-10-CM

## 2018-02-07 PROCEDURE — L3020 FOOT LONGITUD/METATARSAL SUP: HCPCS | Performed by: STUDENT IN AN ORGANIZED HEALTH CARE EDUCATION/TRAINING PROGRAM

## 2018-02-07 PROCEDURE — 11055 PARING/CUTG B9 HYPRKER LES 1: CPT | Performed by: STUDENT IN AN ORGANIZED HEALTH CARE EDUCATION/TRAINING PROGRAM

## 2018-02-07 PROCEDURE — 99213 OFFICE O/P EST LOW 20 MIN: CPT | Performed by: STUDENT IN AN ORGANIZED HEALTH CARE EDUCATION/TRAINING PROGRAM

## 2018-02-07 NOTE — PROGRESS NOTES
[facility] Progress Note    Subjective     Geno Light is a 46y.o. year old female who presents to clinic today for follow-up of bilateral plantar fascitis. Patient states she has been stretching regularly but has not been preforming ice massage, has not gotten athletic shoes or powersteps due to cost. Patient has low dye taping and instructed to use NSAID at last office visit. She reports no change in pain. Patient states the pain has been present for \"quite some time\" and has increasingly gotten worse. Patient admits she is at her heaviest weight currently and spends a lot of time on her feet with her job at [employer]. She relates her pain is worse with weightbearing. Patient denies cramping in her legs at rest or with ambulation, she denies numbness/tingling/burning to LE. She also states her left 5th toe has a painful callus. No other issue and denies n/v/f/c. Primary care physician is Dagmar Parish MD .    ROS: Negative except as noted in the HPI. Denies N/V/F/C/SOB. Objective     Vitals:    02/07/18 1317   BP: 123/77   Pulse: 75       Lab Results   Component Value Date    LABA1C 5.3 04/19/2016       Physical Exam:  General:  Alert and oriented x3. In no acute distress. Lower Extremity Physical Exam:    Vascular: DP and PT pulses are palpable, Bilateral. CFT <3 seconds to all digits, Bilateral.  No edema, Bilateral.  Hair growth is present to the level of the digits, Bilateral.     Neuro: Saph/sural/SP/DP/plantar SILT. Musculoskeletal: EHL/FHL/GS/TA gross motor intact. Pain on palpation of the medial tubercle of the calcaneus and medial plantar fascial band, left. Mild POP of medial calcaneal tubercle, right. Pes planus foot type bilaterally. AJ ROM decreased and pain with dorsiflexion, bilaterally. No palpable dell of achilles or pain on medial-lateral calcaneal squeeze. Gross deformity is absent, Bilateral.     Dermatologic: Skin is warm, dry, and supple.  No open lesions, Bilateral.

## 2018-02-21 ENCOUNTER — HOSPITAL ENCOUNTER (EMERGENCY)
Age: 53
Discharge: HOME | End: 2018-02-21
Attending: EMERGENCY MEDICINE
Payer: COMMERCIAL

## 2018-02-21 ENCOUNTER — APPOINTMENT (OUTPATIENT)
Dept: GENERAL RADIOLOGY | Age: 53
End: 2018-02-21
Payer: COMMERCIAL

## 2018-02-21 VITALS
SYSTOLIC BLOOD PRESSURE: 135 MMHG | RESPIRATION RATE: 19 BRPM | DIASTOLIC BLOOD PRESSURE: 79 MMHG | OXYGEN SATURATION: 97 % | HEART RATE: 76 BPM | TEMPERATURE: 98.7 F

## 2018-02-21 DIAGNOSIS — H81.10 BENIGN PAROXYSMAL POSITIONAL VERTIGO, UNSPECIFIED LATERALITY: ICD-10-CM

## 2018-02-21 DIAGNOSIS — R03.0 ELEVATED BLOOD PRESSURE READING: ICD-10-CM

## 2018-02-21 DIAGNOSIS — H93.13 TINNITUS OF BOTH EARS: Primary | ICD-10-CM

## 2018-02-21 LAB
ABSOLUTE EOS #: 0.14 K/UL (ref 0–0.44)
ABSOLUTE IMMATURE GRANULOCYTE: <0.03 K/UL (ref 0–0.3)
ABSOLUTE LYMPH #: 1.55 K/UL (ref 1.1–3.7)
ABSOLUTE MONO #: 0.36 K/UL (ref 0.1–1.2)
ANION GAP SERPL CALCULATED.3IONS-SCNC: 15 MMOL/L (ref 9–17)
BASOPHILS # BLD: 1 % (ref 0–2)
BASOPHILS ABSOLUTE: 0.06 K/UL (ref 0–0.2)
BUN BLDV-MCNC: 13 MG/DL (ref 6–20)
BUN/CREAT BLD: ABNORMAL (ref 9–20)
CALCIUM SERPL-MCNC: 9.8 MG/DL (ref 8.6–10.4)
CHLORIDE BLD-SCNC: 101 MMOL/L (ref 98–107)
CO2: 24 MMOL/L (ref 20–31)
CREAT SERPL-MCNC: 0.9 MG/DL (ref 0.5–0.9)
DIFFERENTIAL TYPE: ABNORMAL
EKG ATRIAL RATE: 66 BPM
EKG P AXIS: 5 DEGREES
EKG P-R INTERVAL: 116 MS
EKG Q-T INTERVAL: 428 MS
EKG QRS DURATION: 110 MS
EKG QTC CALCULATION (BAZETT): 448 MS
EKG R AXIS: 39 DEGREES
EKG T AXIS: 6 DEGREES
EKG VENTRICULAR RATE: 66 BPM
EOSINOPHILS RELATIVE PERCENT: 2 % (ref 1–4)
GFR AFRICAN AMERICAN: >60 ML/MIN
GFR NON-AFRICAN AMERICAN: >60 ML/MIN
GFR SERPL CREATININE-BSD FRML MDRD: ABNORMAL ML/MIN/{1.73_M2}
GFR SERPL CREATININE-BSD FRML MDRD: ABNORMAL ML/MIN/{1.73_M2}
GLUCOSE BLD-MCNC: 111 MG/DL (ref 70–99)
HCT VFR BLD CALC: 41 % (ref 36.3–47.1)
HEMOGLOBIN: 12.8 G/DL (ref 11.9–15.1)
IMMATURE GRANULOCYTES: 0 %
LYMPHOCYTES # BLD: 23 % (ref 24–43)
MCH RBC QN AUTO: 26.4 PG (ref 25.2–33.5)
MCHC RBC AUTO-ENTMCNC: 31.2 G/DL (ref 28.4–34.8)
MCV RBC AUTO: 84.7 FL (ref 82.6–102.9)
MONOCYTES # BLD: 5 % (ref 3–12)
NRBC AUTOMATED: 0 PER 100 WBC
PDW BLD-RTO: 15.4 % (ref 11.8–14.4)
PLATELET # BLD: 290 K/UL (ref 138–453)
PLATELET ESTIMATE: ABNORMAL
PMV BLD AUTO: 11.9 FL (ref 8.1–13.5)
POC TROPONIN I: 0 NG/ML (ref 0–0.1)
POC TROPONIN I: 2.16 NG/ML (ref 0–0.1)
POC TROPONIN INTERP: ABNORMAL
POC TROPONIN INTERP: NORMAL
POTASSIUM SERPL-SCNC: 4.5 MMOL/L (ref 3.7–5.3)
RBC # BLD: 4.84 M/UL (ref 3.95–5.11)
RBC # BLD: ABNORMAL 10*6/UL
SEG NEUTROPHILS: 69 % (ref 36–65)
SEGMENTED NEUTROPHILS ABSOLUTE COUNT: 4.7 K/UL (ref 1.5–8.1)
SODIUM BLD-SCNC: 140 MMOL/L (ref 135–144)
TROPONIN INTERP: NORMAL
TROPONIN T: <0.03 NG/ML
WBC # BLD: 6.8 K/UL (ref 3.5–11.3)
WBC # BLD: ABNORMAL 10*3/UL

## 2018-02-21 PROCEDURE — 99284 EMERGENCY DEPT VISIT MOD MDM: CPT

## 2018-02-21 PROCEDURE — 80048 BASIC METABOLIC PNL TOTAL CA: CPT

## 2018-02-21 PROCEDURE — 93005 ELECTROCARDIOGRAM TRACING: CPT

## 2018-02-21 PROCEDURE — 84484 ASSAY OF TROPONIN QUANT: CPT

## 2018-02-21 PROCEDURE — 85025 COMPLETE CBC W/AUTO DIFF WBC: CPT

## 2018-02-21 PROCEDURE — 71046 X-RAY EXAM CHEST 2 VIEWS: CPT

## 2018-02-21 PROCEDURE — 6370000000 HC RX 637 (ALT 250 FOR IP): Performed by: PHYSICIAN ASSISTANT

## 2018-02-21 RX ORDER — ASPIRIN 81 MG/1
324 TABLET, CHEWABLE ORAL ONCE
Status: COMPLETED | OUTPATIENT
Start: 2018-02-21 | End: 2018-02-21

## 2018-02-21 RX ORDER — ALPRAZOLAM 0.25 MG/1
0.5 TABLET ORAL ONCE
Status: COMPLETED | OUTPATIENT
Start: 2018-02-21 | End: 2018-02-21

## 2018-02-21 RX ADMIN — ASPIRIN 81 MG 324 MG: 81 TABLET ORAL at 18:28

## 2018-02-21 RX ADMIN — ALPRAZOLAM 0.5 MG: 0.25 TABLET ORAL at 17:48

## 2018-02-21 ASSESSMENT — ENCOUNTER SYMPTOMS
EYE DISCHARGE: 0
EYE ITCHING: 0
NAUSEA: 0
EYE PAIN: 0
BACK PAIN: 0
RHINORRHEA: 0
COLOR CHANGE: 0
SORE THROAT: 0
VOMITING: 0
WHEEZING: 0
COUGH: 0

## 2018-02-21 NOTE — ED PROVIDER NOTES
rebound and no guarding. Musculoskeletal: Normal range of motion. She exhibits no edema. Neurological: She is alert and oriented to person, place, and time. She has normal strength. No cranial nerve deficit or sensory deficit. She displays a negative Romberg sign. Coordination normal. GCS eye subscore is 4. GCS verbal subscore is 5. GCS motor subscore is 6. Heel-to-shin without difficulty   Skin: Skin is warm and dry. No rash (on exposed surfaces) noted. She is not diaphoretic. No pallor. Psychiatric: She has a normal mood and affect. Her behavior is normal.       DIFFERENTIAL  DIAGNOSIS       MI, vertigo, anxiety, PE    PLAN (LABS / IMAGING / EKG):  Orders Placed This Encounter   Procedures    XR CHEST STANDARD (2 VW)    CBC Auto Differential    Basic Metabolic Panel    POCT troponin    POCT troponin    POCT troponin    POCT troponin    EKG 12 Lead       MEDICATIONS ORDERED:  Orders Placed This Encounter   Medications    ALPRAZolam (XANAX) tablet 0.5 mg    aspirin chewable tablet 324 mg    DISCONTD: enoxaparin (LOVENOX) injection 1 mg/kg       Controlled Substances Monitoring:      DIAGNOSTIC RESULTS / EMERGENCY DEPARTMENT COURSE / MDM   Patient reports that her symptoms have completely resolved. She is having some ringing in her ears. Initial point of care troponin was quite elevated at over 2 but repeat point-of-care troponin was 0. Extend down troponin was also not elevated. EKG shows no acute changes. Most likely lab air. Patient to follow-up with her PCP, did recommend using the Valium that she has at home for dizziness until she can get into see her otolaryngologist    Pre-hypertention/Hypertension:  The patient has been informed that they may have pre-hypertension or hypertension based on a blood pressure reading in the emergency Department.   I recommend that the patient call the primary care provider listed on the discharge instructions or physician of their choice this week to arrange follow-up for further evaluation of possible pre-hypertension or hypertension    RADIOLOGY:   I directly visualized (with the attending physician) the following  images and reviewed the radiologist interpretations:  No results found. XR CHEST STANDARD (2 VW)   Final Result   No acute focal airspace consolidation.              LABS:  Results for orders placed or performed during the hospital encounter of 02/21/18   CBC Auto Differential   Result Value Ref Range    WBC 6.8 3.5 - 11.3 k/uL    RBC 4.84 3.95 - 5.11 m/uL    Hemoglobin 12.8 11.9 - 15.1 g/dL    Hematocrit 41.0 36.3 - 47.1 %    MCV 84.7 82.6 - 102.9 fL    MCH 26.4 25.2 - 33.5 pg    MCHC 31.2 28.4 - 34.8 g/dL    RDW 15.4 (H) 11.8 - 14.4 %    Platelets 207 002 - 524 k/uL    MPV 11.9 8.1 - 13.5 fL    NRBC Automated 0.0 0.0 per 100 WBC    Differential Type NOT REPORTED     Seg Neutrophils 69 (H) 36 - 65 %    Lymphocytes 23 (L) 24 - 43 %    Monocytes 5 3 - 12 %    Eosinophils % 2 1 - 4 %    Basophils 1 0 - 2 %    Immature Granulocytes 0 0 %    Segs Absolute 4.70 1.50 - 8.10 k/uL    Absolute Lymph # 1.55 1.10 - 3.70 k/uL    Absolute Mono # 0.36 0.10 - 1.20 k/uL    Absolute Eos # 0.14 0.00 - 0.44 k/uL    Basophils # 0.06 0.00 - 0.20 k/uL    Absolute Immature Granulocyte <0.03 0.00 - 0.30 k/uL    WBC Morphology NOT REPORTED     RBC Morphology ANISOCYTOSIS PRESENT     Platelet Estimate NOT REPORTED    Basic Metabolic Panel   Result Value Ref Range    Glucose 111 (H) 70 - 99 mg/dL    BUN 13 6 - 20 mg/dL    CREATININE 0.90 0.50 - 0.90 mg/dL    Bun/Cre Ratio NOT REPORTED 9 - 20    Calcium 9.8 8.6 - 10.4 mg/dL    Sodium 140 135 - 144 mmol/L    Potassium 4.5 3.7 - 5.3 mmol/L    Chloride 101 98 - 107 mmol/L    CO2 24 20 - 31 mmol/L    Anion Gap 15 9 - 17 mmol/L    GFR Non-African American >60 >60 mL/min    GFR African American >60 >60 mL/min    GFR Comment          GFR Staging NOT REPORTED    Troponin   Result Value Ref Range    Troponin T <0.03 <0.03 ng/mL

## 2018-02-22 NOTE — ED NOTES
Report from [place]. Bossman Baron to update pt and determine plan of discharge.       Meghann Hall RN  02/21/18 1933

## 2018-02-22 NOTE — ED PROVIDER NOTES
SpO2: 97 %    EKG Interpretation    Interpreted by me    Rhythm: normal sinus   Rate: normal  Axis: normal  Ectopy: none  Conduction:  ST Segments: depression anteriorly  T Waves: inversion V2, 3, III  Q Waves: none    Clinical Impression: RBBB, unchanged from previous    This patient is a 46 y.o. Female with Anxiety, dizziness, tenderness, lightheadedness, hot sensation. She has a history of anxiety as well as vertigo considered peripheral as well as tenderness. No known history of Ménière's syndrome. There is no chest pain palpitations shortness of breath leg pain calf swelling immobility. She has a distant history of being on Coumadin for some clot in her brain but no known diagnosis. This was discontinued and her neurologist told her that there may not have ever been a clot. On my exam she appears comfortable vital signs are normal.  No edema cords Homans or calf tenderness. Normal pulses. No nystagmus. Her initial bedside troponin was quite elevated however repeat is normal.  Her EKG is nonspecific right bundle branch block pattern. Plan is fluids, labs, repeat troponin, anxiolytic. Ultimately he would recommend follow-up to ENT for reevaluation and consider Ménière syndrome and assess hearing. OUTSTANDING TASKS / RECOMMENDATIONS:    1. Fluids  2. Labs  3. Repeat troponin  4. Anxiolytics       Narciso Son MD, [place]  Attending Emergency Physician  [facility] ED        Sendy John MD  02/21/18 Emmy Gonzalez

## 2018-03-14 ENCOUNTER — OFFICE VISIT (OUTPATIENT)
Dept: PODIATRY | Age: 53
End: 2018-03-14
Payer: COMMERCIAL

## 2018-03-14 VITALS
HEIGHT: 64 IN | SYSTOLIC BLOOD PRESSURE: 121 MMHG | WEIGHT: 218 LBS | HEART RATE: 68 BPM | BODY MASS INDEX: 37.22 KG/M2 | DIASTOLIC BLOOD PRESSURE: 75 MMHG

## 2018-03-14 DIAGNOSIS — M72.2 PLANTAR FASCIITIS: Primary | ICD-10-CM

## 2018-03-14 DIAGNOSIS — M79.672 FOOT PAIN, BILATERAL: ICD-10-CM

## 2018-03-14 DIAGNOSIS — M79.671 FOOT PAIN, BILATERAL: ICD-10-CM

## 2018-03-14 PROCEDURE — G8484 FLU IMMUNIZE NO ADMIN: HCPCS | Performed by: PODIATRIST

## 2018-03-14 PROCEDURE — G8427 DOCREV CUR MEDS BY ELIG CLIN: HCPCS | Performed by: PODIATRIST

## 2018-03-14 PROCEDURE — 99213 OFFICE O/P EST LOW 20 MIN: CPT | Performed by: PODIATRIST

## 2018-03-14 PROCEDURE — 3014F SCREEN MAMMO DOC REV: CPT | Performed by: PODIATRIST

## 2018-03-14 PROCEDURE — 1036F TOBACCO NON-USER: CPT | Performed by: PODIATRIST

## 2018-03-14 PROCEDURE — G8417 CALC BMI ABV UP PARAM F/U: HCPCS | Performed by: PODIATRIST

## 2018-03-14 PROCEDURE — 3017F COLORECTAL CA SCREEN DOC REV: CPT | Performed by: PODIATRIST

## 2018-03-14 NOTE — PROGRESS NOTES
Patient instructed to remove shoes and socks, instructed to sit in exam chair. Current PCP name is Yamile Krishnamurthy and date of last visit 02/2018. Do you have a follow up visit scheduled?   Yes unknown   `
clinical concern regarding plantar fasciitis, dedicated MR of   the left ankle without contrast would be a more sensitive examination.             Assessment    Angeline Hanks is a 46 y.o. female with:     1. Plantar fasciitis    2. Foot pain, bilateral      Plan      - Patient examined and evaluated  - Diagnosis and treatment options discussed in detail with patient and attending  - Dispensed orthotics and instructed in break in period.   - Continue stretching exercises as instructed and ice massage  --RTC 1 month orthotic check. Possible injection if symptomatic.      Electronically signed by Abdulkadir Bolton DPM on 3/14/2018 at 3:03 PM

## 2018-08-31 ENCOUNTER — OFFICE VISIT (OUTPATIENT)
Dept: INTERNAL MEDICINE | Age: 53
End: 2018-08-31
Payer: MEDICAID

## 2018-08-31 VITALS
HEART RATE: 68 BPM | DIASTOLIC BLOOD PRESSURE: 83 MMHG | WEIGHT: 208.8 LBS | BODY MASS INDEX: 35.65 KG/M2 | HEIGHT: 64 IN | SYSTOLIC BLOOD PRESSURE: 137 MMHG

## 2018-08-31 DIAGNOSIS — Z23 NEED FOR PROPHYLACTIC VACCINATION AND INOCULATION AGAINST VARICELLA: ICD-10-CM

## 2018-08-31 DIAGNOSIS — R42 VERTIGO: Primary | ICD-10-CM

## 2018-08-31 DIAGNOSIS — Z12.31 ENCOUNTER FOR SCREENING MAMMOGRAM FOR BREAST CANCER: ICD-10-CM

## 2018-08-31 PROCEDURE — 99213 OFFICE O/P EST LOW 20 MIN: CPT | Performed by: INTERNAL MEDICINE

## 2018-08-31 PROCEDURE — 99211 OFF/OP EST MAY X REQ PHY/QHP: CPT | Performed by: INTERNAL MEDICINE

## 2018-08-31 ASSESSMENT — ENCOUNTER SYMPTOMS
HEMOPTYSIS: 0
BLURRED VISION: 0
DOUBLE VISION: 0
HEARTBURN: 0
NAUSEA: 0
COUGH: 0
ABDOMINAL PAIN: 0

## 2018-08-31 NOTE — PROGRESS NOTES
137/83   03/14/18 121/75   02/21/18 135/79        Physical Exam   Constitutional: She is oriented to person, place, and time and well-developed, well-nourished, and in no distress. No distress. HENT:   Mouth/Throat: No oropharyngeal exudate. Neck: Normal range of motion. Neck supple. No thyromegaly present. Cardiovascular: Normal rate, regular rhythm, normal heart sounds and intact distal pulses. Pulmonary/Chest: Effort normal and breath sounds normal. No respiratory distress. She has no wheezes. Abdominal: Soft. Bowel sounds are normal. She exhibits no distension and no mass. There is no tenderness. Musculoskeletal: Normal range of motion. She exhibits no edema or tenderness. Neurological: She is alert and oriented to person, place, and time. No cranial nerve deficit. Skin: Skin is warm. No rash noted. She is not diaphoretic. No erythema. Psychiatric: Mood, memory, affect and judgment normal.         DIAGNOSTIC FINDINGS:  CBC:  Lab Results   Component Value Date    WBC 6.8 02/21/2018    HGB 12.8 02/21/2018     02/21/2018       BMP:    Lab Results   Component Value Date     02/21/2018    K 4.5 02/21/2018     02/21/2018    CO2 24 02/21/2018    BUN 13 02/21/2018    CREATININE 0.90 02/21/2018    GLUCOSE 111 02/21/2018       HEMOGLOBIN A1C:   Lab Results   Component Value Date    LABA1C 5.3 04/19/2016       FASTING LIPID PANEL:  Lab Results   Component Value Date    CHOL 142 04/20/2016    HDL 44 04/20/2016    TRIG 70 04/20/2016       ASSESSMENT AND PLAN:  Nay Novoa was seen today for blurred vision and health maintenance. Diagnoses and all orders for this visit:    Vertigo       -    Resolved. No further workup is needed at this time. Encounter for screening mammogram for breast cancer  -     [facility] Digital Screen Bilateral [[id number]];  Future    Need for prophylactic vaccination and inoculation against varicella  -     zoster recombinant adjuvanted vaccine ([address]) 50 MCG SUSR injection; Inject 0.5 mLs into the muscle once for 1 dose 50 MCG IM then repeat 2-6 months. FOLLOW UP AND INSTRUCTIONS:  · Return in about 6 months (around 2/28/2019). · Trinity Kraft received counseling on the following healthy behaviors: nutrition and exercise    · Discussed use, benefit, and side effects of prescribed medications. Barriers to medication compliance addressed. All patient questions answered. Pt voiced understanding. · Patient given educational materials - see patient instructions    Dilshad Cheney MD, CECI, [address] [place] [place] [address] Internal Medicine Associate  8/31/2018, 4:28 PM    This note is created with the assistance of a speech-recognition program. While intending to generate a document that actually reflects the content of the visit, the document can still have some mistakes which may not have been identified and corrected by editing.

## 2018-08-31 NOTE — PATIENT INSTRUCTIONS
You have been given an order and instructions for a mammogram.  The order was faxed to the scheduling department and they will contact you with an appointment. Please bring order with you to that appointment. The scheduling number is [phone] if you have scheduling problems. Please take order for shingrix injection to your pharmacy for administration    Please call for a six month appt for a pap and Summary of Care was reviewed and copy was given to the patient.   FERMIN

## 2018-08-31 NOTE — PROGRESS NOTES
Visit Information    Have you changed or started any medications since your last visit including any over-the-counter medicines, vitamins, or herbal medicines? no   Have you stopped taking any of your medications? Is so, why? -  no  Are you having any side effects from any of your medications? - no    Have you seen any other physician or provider since your last visit? yes - PT   Have you had any other diagnostic tests since your last visit?  no   Have you been seen in the emergency room and/or had an admission in a hospital since we last saw you?  no   Have you had your routine dental cleaning in the past 6 months?  no     Do you have an active MyChart account? If no, what is the barrier?   No:     Patient Care Team:  Sahil Tabares MD as PCP - General (Internal Medicine)    Medical History Review  Past Medical, Family, and Social History reviewed and does not contribute to the patient presenting condition    Health Maintenance   Topic Date Due    Hepatitis C screen  1965    HIV screen  05/24/1980    DTaP/Tdap/Td vaccine (1 - Tdap) 05/24/1984    Cervical cancer screen  05/24/1986    Breast cancer screen  05/24/2015    Shingles Vaccine (1 of 2 - 2 Dose Series) 05/24/2015    Colon cancer screen colonoscopy  05/24/2015    Flu vaccine (1) 09/01/2018    Diabetes screen  04/19/2019    Lipid screen  04/20/2021

## 2018-10-30 NOTE — ED PROVIDER NOTES
[facility]     Emergency Department     Faculty Attestation    I performed a history and physical examination of the patient and discussed management with the resident. I reviewed the residents note and agree with the documented findings and plan of care. Any areas of disagreement are noted on the chart. I was personally present for the key portions of any procedures. I have documented in the chart those procedures where I was not present during the key portions. I have reviewed the emergency nurses triage note. I agree with the chief complaint, past medical history, past surgical history, allergies, medications, social and family history as documented unless otherwise noted below. Documentation of the HPI, Physical Exam and Medical Decision Making performed by medical students or scribes is based on my personal performance of the HPI, PE and MDM. For Physician Assistant/ Nurse Practitioner cases/documentation I have personally evaluated this patient and have completed at least one if not all key elements of the E/M (history, physical exam, and MDM). Additional findings are as noted. Vital signs:   Vitals:    12/02/17 0933   BP: (!) 150/86   Pulse: 76   Resp: 16   Temp: 97.2 °F (36.2 °C)   SpO2: 80       44-year-old female here with vertigo. Possible history of basilar artery stenosis, but subsequent imaging on CTA has shown no stenosis. No other neurologic symptoms. She is nauseated but not vomiting. History of Factor V Leiden, on aspirin and no anticoagulants. Also has history of iron deficiency anemia. Symptoms are exacerbated by movement of her head. On physical exam, she is alert and afebrile. Her TMs appear normal.  She does have exacerbation of symptoms with movement of her head. With fatigable nystagmus in the horizontal plane. No motor deficits or sensory deficits noted.        EKG Interpretation    Interpreted by emergency department physician    Rhythm: normal sinus Cardiac

## 2019-04-26 ENCOUNTER — OFFICE VISIT (OUTPATIENT)
Dept: INTERNAL MEDICINE | Age: 54
End: 2019-04-26
Payer: COMMERCIAL

## 2019-04-26 VITALS
SYSTOLIC BLOOD PRESSURE: 126 MMHG | HEIGHT: 64 IN | WEIGHT: 208 LBS | HEART RATE: 66 BPM | DIASTOLIC BLOOD PRESSURE: 77 MMHG | BODY MASS INDEX: 35.51 KG/M2

## 2019-04-26 DIAGNOSIS — D68.51 HETEROZYGOUS FACTOR V LEIDEN MUTATION (HCC): ICD-10-CM

## 2019-04-26 DIAGNOSIS — F41.9 ANXIETY: ICD-10-CM

## 2019-04-26 DIAGNOSIS — Z12.31 ENCOUNTER FOR SCREENING MAMMOGRAM FOR BREAST CANCER: ICD-10-CM

## 2019-04-26 DIAGNOSIS — Z13.1 ENCOUNTER FOR SCREENING FOR DIABETES MELLITUS: ICD-10-CM

## 2019-04-26 DIAGNOSIS — Z12.11 SCREENING FOR COLON CANCER: ICD-10-CM

## 2019-04-26 DIAGNOSIS — R53.83 FATIGUE, UNSPECIFIED TYPE: Primary | ICD-10-CM

## 2019-04-26 DIAGNOSIS — Z00.00 HEALTH CARE MAINTENANCE: ICD-10-CM

## 2019-04-26 DIAGNOSIS — D64.9 ANEMIA, UNSPECIFIED TYPE: ICD-10-CM

## 2019-04-26 PROCEDURE — 3017F COLORECTAL CA SCREEN DOC REV: CPT | Performed by: INTERNAL MEDICINE

## 2019-04-26 PROCEDURE — 99213 OFFICE O/P EST LOW 20 MIN: CPT | Performed by: INTERNAL MEDICINE

## 2019-04-26 PROCEDURE — G8417 CALC BMI ABV UP PARAM F/U: HCPCS | Performed by: INTERNAL MEDICINE

## 2019-04-26 PROCEDURE — 99211 OFF/OP EST MAY X REQ PHY/QHP: CPT | Performed by: INTERNAL MEDICINE

## 2019-04-26 PROCEDURE — G8427 DOCREV CUR MEDS BY ELIG CLIN: HCPCS | Performed by: INTERNAL MEDICINE

## 2019-04-26 PROCEDURE — 1036F TOBACCO NON-USER: CPT | Performed by: INTERNAL MEDICINE

## 2019-04-26 RX ORDER — IBUPROFEN 600 MG/1
600 TABLET ORAL 3 TIMES DAILY PRN
Qty: 90 TABLET | Refills: 0 | Status: SHIPPED | OUTPATIENT
Start: 2019-04-26 | End: 2019-11-06

## 2019-04-26 RX ORDER — PAROXETINE HYDROCHLORIDE 20 MG/1
20 TABLET, FILM COATED ORAL DAILY
Qty: 30 TABLET | Refills: 3 | Status: SHIPPED | OUTPATIENT
Start: 2019-04-26 | End: 2019-07-26 | Stop reason: SINTOL

## 2019-04-26 ASSESSMENT — PATIENT HEALTH QUESTIONNAIRE - PHQ9
1. LITTLE INTEREST OR PLEASURE IN DOING THINGS: 0
SUM OF ALL RESPONSES TO PHQ QUESTIONS 1-9: 0
SUM OF ALL RESPONSES TO PHQ QUESTIONS 1-9: 0
2. FEELING DOWN, DEPRESSED OR HOPELESS: 0
SUM OF ALL RESPONSES TO PHQ9 QUESTIONS 1 & 2: 0

## 2019-04-26 NOTE — PROGRESS NOTES
Visit Information    Have you changed or started any medications since your last visit including any over-the-counter medicines, vitamins, or herbal medicines? no   Have you stopped taking any of your medications? Is so, why? -  no  Are you having any side effects from any of your medications? - no    Have you seen any other physician or provider since your last visit?  no   Have you had any other diagnostic tests since your last visit?  no   Have you been seen in the emergency room and/or had an admission in a hospital since we last saw you?  no   Have you had your routine dental cleaning in the past 6 months?  no     Do you have an active MyChart account? If no, what is the barrier?   Yes    Patient Care Team:  Lendel Sacks, MD as PCP - General (Internal Medicine)    Medical History Review  Past Medical, Family, and Social History reviewed and does not contribute to the patient presenting condition    Health Maintenance   Topic Date Due    Hepatitis C screen  1965    HIV screen  05/24/1980    DTaP/Tdap/Td vaccine (1 - Tdap) 05/24/1984    Cervical cancer screen  05/24/1986    Breast cancer screen  05/24/2015    Shingles Vaccine (1 of 2) 05/24/2015    Colon cancer screen colonoscopy  05/24/2015    Diabetes screen  04/19/2019    Flu vaccine (Season Ended) 09/01/2019    Lipid screen  04/20/2021    Pneumococcal 0-64 years Vaccine  Aged Out

## 2019-04-26 NOTE — PROGRESS NOTES
[facility]  [facility] [address] [facility]  [address]  Dept: [phone]      Today's Date: 4/26/2019  Patient Name: Giovanni Kenny  Patient's age: 48 y.o., 1965        CHIEF COMPLAINT:    Chief Complaint   Patient presents with    Fatigue     patient thinks she may be menopausal    Health Maintenance     pending, vaccines due, declined       History Obtained From:  patient    HISTORY OF PRESENT ILLNESS:      The patient is a 48 y.o. old  female and is here to follow-up for fatigue. She has been feeling tired lately. She has history of iron deficiency anemia. Denies any acute bleeding. She also has left knee pain which worsens on weightbearing and walking. Denies any trauma. There is no knee swelling or redness reported. No history of fever or chills. She has anxiety for which she is on Paxil. She needs refill. Patient Active Problem List   Diagnosis    Iron deficiency anemia    Heterozygous factor V Leiden mutation ([facility] [address]    Obesity (BMI 30-39. 9)    Warfarin anticoagulation    GERD (gastroesophageal reflux disease)    Anxiety    Dyslipidemia (high LDL; low HDL)    Vertigo       Past Medical History:   has a past medical history of Arthritis, Basilar artery stenosis, GERD (gastroesophageal reflux disease), Heterozygous factor V Leiden mutation ([address] Hyperlipidemia, Iron deficiency anemia, Migraine, Obesity (BMI 30-39.9), Panic disorder, and Warfarin anticoagulation. Past Surgical History:   has a past surgical history that includes Cholecystectomy; Tubal ligation; and Carpal tunnel release. Medications:    Current Outpatient Medications   Medication Sig Dispense Refill    aspirin 81 MG tablet Take 1 tablet by mouth daily.  30 tablet 3    ibuprofen (ADVIL;MOTRIN) 800 MG tablet Take 1 tablet by mouth every 6 hours as needed for Pain 60 tablet 0    diazepam (VALIUM) 5 MG tablet Take 1 tablet by mouth every 8 hours as needed (vertigo)  Do not drive or operate heavy machinery while on this medication. . 20 tablet 0     No current facility-administered medications for this visit. Allergies:  Patient has no known allergies. Social History:   reports that she has quit smoking. She has never used smokeless tobacco. She reports that she drinks alcohol. She reports that she does not use drugs. Family History: family history includes Cancer in her maternal grandmother; Diabetes in her maternal grandfather; Osteoarthritis in her mother; Other in her mother. REVIEW OF SYSTEMS:      Constitutional: Negative for fever and fatigue. HENT: Negative for congestion and sore throat. Eyes: Negative for eye pain and visual disturbance. Respiratory: Negative for chest tightness and shortness of breath. Cardiovascular: Negative for chest pain and orthopnea . Gastrointestinal: Negative for vomiting, abdominal pain, constipation and diarrhea. Endocrine: Negative for cold intolerance, heat intolerance, polydipsia and polyuria. Genitourinary: Negative for dysuria and frequency. Musculoskeletal: Negative for  Myalgia, . Neurological: Negative for dizziness, weakness and headaches. PHYSICAL EXAM:        /77 (Site: Left Upper Arm, Position: Sitting, Cuff Size: Medium Adult)   Pulse 66   Ht 5' 4\" (1.626 m)   Wt 208 lb (94.3 kg)   LMP  (LMP Unknown)   BMI 35.70 kg/m²      General appearance - well appearing, not in  distress. Mental status - alert and cooperative . Head: Normocephalic, without obvious abnormality, atraumatic. Eye: PERRL, conjunctiva/corneas clear, EOM's intact. Neck - Supple, no significant adenopathy . Chest - Clear to auscultation, no wheezes, rales or rhonchi, symmetric air entry. Heart -  regular rhythm, normal S1, S2, no murmurs. Abdomen - Soft, nontender, nondistended, no masses or organomegaly.    Neurological - Alert, oriented, normal speech, no focal findings or movement disorder noted. Musculoskeletal - no bony deformity or tenderness, range of motion full. .   Extremities -  No pedal edema, no clubbing or cyanosis. Labs:       Chemistry        Component Value Date/Time     02/21/2018 1750    K 4.5 02/21/2018 1750     02/21/2018 1750    CO2 24 02/21/2018 1750    BUN 13 02/21/2018 1750    CREATININE 0.90 02/21/2018 1750        Component Value Date/Time    CALCIUM 9.8 02/21/2018 1750    ALKPHOS 82 12/02/2017 1006    AST 13 12/02/2017 1006    ALT 9 12/02/2017 1006    BILITOT 0.39 12/02/2017 1006          No results for input(s): GLUCOSE in the last 72 hours. Lab Results   Component Value Date    WBC 6.8 02/21/2018    HGB 12.8 02/21/2018    HCT 41.0 02/21/2018    MCV 84.7 02/21/2018     02/21/2018     Lab Results   Component Value Date    TSH 1.15 04/20/2016     Lab Results   Component Value Date    LABA1C 5.3 04/19/2016     No results found for: LABMICR, [id number]  No components found for: CHLPL  Lab Results   Component Value Date    TRIG 70 04/20/2016     Lab Results   Component Value Date    HDL 44 04/20/2016     Lab Results   Component Value Date    LDLCHOLESTEROL 84 04/20/2016     The ASCVD Risk score (Carol Ji et al., 2013) failed to calculate for the following reasons:    Cannot find a previous HDL lab    Cannot find a previous total cholesterol lab    Unable to determine if patient is Non-     Health Maintenance Due   Topic Date Due    Hepatitis C screen  1965    HIV screen  05/24/1980    DTaP/Tdap/Td vaccine (1 - Tdap) 05/24/1984    Cervical cancer screen  05/24/1986    Breast cancer screen  05/24/2015    Shingles Vaccine (1 of 2) 05/24/2015    Colon cancer screen colonoscopy  05/24/2015    Diabetes screen  04/19/2019        ASSESSMENT AND PLAN    1. Fatigue, unspecified type    - Comprehensive Metabolic Panel; Future  - Hemoglobin A1C; Future  - TSH with Reflex; Future    2.  Anemia, unspecified type    - CBC Auto Differential; Future    3. Encounter for screening mammogram for breast cancer    - ALBARO Digital Screen Bilateral [[id number]]; Future    4. Screening for colon cancer    - POCT FECAL IMMUNOCHEMICAL TEST (FIT); Future    5. Encounter for screening for diabetes mellitus      6. Health care maintenance    - Lipid Panel; Future    7. Anxiety    - PARoxetine (PAXIL) 20 MG tablet; Take 1 tablet by mouth daily  Dispense: 30 tablet; Refill: 3    8. Heterozygous factor V Leiden mutation ([address]        · Labs as ordered   · Mammogram   · Patient declines colonoscopy however agrees for FIT test.   · Ike Moulton received counseling on the following healthy behaviors: exercise and medication adherence  · Discussed use, benefit, and side effects of prescribed medications. Barriers to medication compliance addressed. All patient questions answered. Pt voiced understanding. · The return visit should be in 4 weeks      Fernando Rodriguez MD  Attending and Faculty Internal Medicine  [facility]  [address]  [address] [place] [address]  Dept: [phone]  4/26/19      This note is created with the assistance of a speech-recognition program. While intending to generate a document that actually reflects the content of the visit, the document can still have some mistakes which may not have been identified and corrected by editing.

## 2019-04-26 NOTE — PATIENT INSTRUCTIONS
Follow-up appointment scheduled for 5/22/19, AVS given to patient. OC-Light hemoccult collection kit given to patient and procedure explained. Your doctor has ordered fasting blood work.   It can be done at [facility] or at the hospital. Rock Ramachandran will need to fast for 12 hours and bring order with you to registration department.    -Order for mammogram faxed to scheduling, they will contact patient with an appt original order given to patient along with scheduling phone number     [facility]

## 2019-05-03 ENCOUNTER — HOSPITAL ENCOUNTER (OUTPATIENT)
Age: 54
Setting detail: SPECIMEN
Discharge: HOME | End: 2019-05-03
Payer: COMMERCIAL

## 2019-05-03 DIAGNOSIS — R53.83 FATIGUE, UNSPECIFIED TYPE: ICD-10-CM

## 2019-05-03 DIAGNOSIS — D64.9 ANEMIA, UNSPECIFIED TYPE: ICD-10-CM

## 2019-05-03 DIAGNOSIS — Z00.00 HEALTH CARE MAINTENANCE: ICD-10-CM

## 2019-05-03 LAB
ABSOLUTE EOS #: 0.13 K/UL (ref 0–0.44)
ABSOLUTE IMMATURE GRANULOCYTE: <0.03 K/UL (ref 0–0.3)
ABSOLUTE LYMPH #: 1.21 K/UL (ref 1.1–3.7)
ABSOLUTE MONO #: 0.42 K/UL (ref 0.1–1.2)
ALBUMIN SERPL-MCNC: 4.1 G/DL (ref 3.5–5.2)
ALBUMIN/GLOBULIN RATIO: 1.3 (ref 1–2.5)
ALP BLD-CCNC: 104 U/L (ref 35–104)
ALT SERPL-CCNC: 30 U/L (ref 5–33)
ANION GAP SERPL CALCULATED.3IONS-SCNC: 11 MMOL/L (ref 9–17)
AST SERPL-CCNC: 28 U/L
BASOPHILS # BLD: 1 % (ref 0–2)
BASOPHILS ABSOLUTE: 0.04 K/UL (ref 0–0.2)
BILIRUB SERPL-MCNC: 0.31 MG/DL (ref 0.3–1.2)
BUN BLDV-MCNC: 14 MG/DL (ref 6–20)
BUN/CREAT BLD: NORMAL (ref 9–20)
CALCIUM SERPL-MCNC: 9.2 MG/DL (ref 8.6–10.4)
CHLORIDE BLD-SCNC: 106 MMOL/L (ref 98–107)
CHOLESTEROL/HDL RATIO: 3.5
CHOLESTEROL: 126 MG/DL
CO2: 24 MMOL/L (ref 20–31)
CREAT SERPL-MCNC: 0.7 MG/DL (ref 0.5–0.9)
DIFFERENTIAL TYPE: ABNORMAL
EOSINOPHILS RELATIVE PERCENT: 2 % (ref 1–4)
ESTIMATED AVERAGE GLUCOSE: 103 MG/DL
GFR AFRICAN AMERICAN: >60 ML/MIN
GFR NON-AFRICAN AMERICAN: >60 ML/MIN
GFR SERPL CREATININE-BSD FRML MDRD: NORMAL ML/MIN/{1.73_M2}
GFR SERPL CREATININE-BSD FRML MDRD: NORMAL ML/MIN/{1.73_M2}
GLUCOSE BLD-MCNC: 91 MG/DL (ref 70–99)
HBA1C MFR BLD: 5.2 % (ref 4–6)
HCT VFR BLD CALC: 46.1 % (ref 36.3–47.1)
HDLC SERPL-MCNC: 36 MG/DL
HEMOGLOBIN: 14.7 G/DL (ref 11.9–15.1)
IMMATURE GRANULOCYTES: 0 %
LDL CHOLESTEROL: 73 MG/DL (ref 0–130)
LYMPHOCYTES # BLD: 19 % (ref 24–43)
MCH RBC QN AUTO: 29.4 PG (ref 25.2–33.5)
MCHC RBC AUTO-ENTMCNC: 31.9 G/DL (ref 28.4–34.8)
MCV RBC AUTO: 92.2 FL (ref 82.6–102.9)
MONOCYTES # BLD: 6 % (ref 3–12)
NRBC AUTOMATED: 0 PER 100 WBC
PDW BLD-RTO: 13.2 % (ref 11.8–14.4)
PLATELET # BLD: 237 K/UL (ref 138–453)
PLATELET ESTIMATE: ABNORMAL
PMV BLD AUTO: 11.7 FL (ref 8.1–13.5)
POTASSIUM SERPL-SCNC: 4.4 MMOL/L (ref 3.7–5.3)
RBC # BLD: 5 M/UL (ref 3.95–5.11)
RBC # BLD: ABNORMAL 10*6/UL
SEG NEUTROPHILS: 72 % (ref 36–65)
SEGMENTED NEUTROPHILS ABSOLUTE COUNT: 4.72 K/UL (ref 1.5–8.1)
SODIUM BLD-SCNC: 141 MMOL/L (ref 135–144)
TOTAL PROTEIN: 7.2 G/DL (ref 6.4–8.3)
TRIGL SERPL-MCNC: 86 MG/DL
TSH SERPL DL<=0.05 MIU/L-ACNC: 1.37 MIU/L (ref 0.3–5)
VLDLC SERPL CALC-MCNC: ABNORMAL MG/DL (ref 1–30)
WBC # BLD: 6.5 K/UL (ref 3.5–11.3)
WBC # BLD: ABNORMAL 10*3/UL

## 2019-05-03 PROCEDURE — 84443 ASSAY THYROID STIM HORMONE: CPT

## 2019-05-03 PROCEDURE — 80053 COMPREHEN METABOLIC PANEL: CPT

## 2019-05-03 PROCEDURE — 80061 LIPID PANEL: CPT

## 2019-05-03 PROCEDURE — 36415 COLL VENOUS BLD VENIPUNCTURE: CPT

## 2019-05-03 PROCEDURE — 85025 COMPLETE CBC W/AUTO DIFF WBC: CPT

## 2019-05-03 PROCEDURE — 83036 HEMOGLOBIN GLYCOSYLATED A1C: CPT

## 2019-05-07 ENCOUNTER — OFFICE VISIT (OUTPATIENT)
Dept: INTERNAL MEDICINE | Age: 54
End: 2019-05-07
Payer: COMMERCIAL

## 2019-05-07 VITALS
HEIGHT: 64 IN | BODY MASS INDEX: 35.34 KG/M2 | WEIGHT: 207 LBS | SYSTOLIC BLOOD PRESSURE: 127 MMHG | DIASTOLIC BLOOD PRESSURE: 76 MMHG | HEART RATE: 65 BPM

## 2019-05-07 DIAGNOSIS — Z00.00 HEALTH CARE MAINTENANCE: ICD-10-CM

## 2019-05-07 DIAGNOSIS — J06.9 VIRAL UPPER RESPIRATORY INFECTION: ICD-10-CM

## 2019-05-07 DIAGNOSIS — L91.8 SKIN TAG: Primary | ICD-10-CM

## 2019-05-07 DIAGNOSIS — M54.31 RIGHT SIDED SCIATICA: ICD-10-CM

## 2019-05-07 PROCEDURE — 3017F COLORECTAL CA SCREEN DOC REV: CPT | Performed by: STUDENT IN AN ORGANIZED HEALTH CARE EDUCATION/TRAINING PROGRAM

## 2019-05-07 PROCEDURE — G8427 DOCREV CUR MEDS BY ELIG CLIN: HCPCS | Performed by: STUDENT IN AN ORGANIZED HEALTH CARE EDUCATION/TRAINING PROGRAM

## 2019-05-07 PROCEDURE — 1036F TOBACCO NON-USER: CPT | Performed by: STUDENT IN AN ORGANIZED HEALTH CARE EDUCATION/TRAINING PROGRAM

## 2019-05-07 PROCEDURE — G8417 CALC BMI ABV UP PARAM F/U: HCPCS | Performed by: STUDENT IN AN ORGANIZED HEALTH CARE EDUCATION/TRAINING PROGRAM

## 2019-05-07 PROCEDURE — 99214 OFFICE O/P EST MOD 30 MIN: CPT | Performed by: STUDENT IN AN ORGANIZED HEALTH CARE EDUCATION/TRAINING PROGRAM

## 2019-05-07 PROCEDURE — 99211 OFF/OP EST MAY X REQ PHY/QHP: CPT | Performed by: INTERNAL MEDICINE

## 2019-05-07 RX ORDER — LORATADINE 10 MG/1
10 TABLET ORAL DAILY
Qty: 7 TABLET | Refills: 0 | Status: SHIPPED | OUTPATIENT
Start: 2019-05-07 | End: 2019-05-14

## 2019-05-07 RX ORDER — GUAIFENESIN/DEXTROMETHORPHAN 100-10MG/5
5 SYRUP ORAL 3 TIMES DAILY PRN
Qty: 120 ML | Refills: 0 | Status: SHIPPED | OUTPATIENT
Start: 2019-05-07 | End: 2019-05-17

## 2019-05-07 RX ORDER — MULTIVIT-MIN/IRON FUM/FOLIC AC 7.5 MG-4
1 TABLET ORAL DAILY
Qty: 30 TABLET | Refills: 3 | Status: SHIPPED | OUTPATIENT
Start: 2019-05-07

## 2019-05-07 RX ORDER — AZITHROMYCIN 500 MG/1
500 TABLET, FILM COATED ORAL DAILY
Qty: 3 TABLET | Refills: 0 | Status: SHIPPED | OUTPATIENT
Start: 2019-05-07 | End: 2019-05-10

## 2019-05-07 NOTE — PROGRESS NOTES
 Breast cancer screen  05/24/2015    Shingles Vaccine (1 of 2) 05/24/2015    Colon cancer screen colonoscopy  05/24/2015       No Known Allergies      Current Outpatient Medications   Medication Sig Dispense Refill    ibuprofen (ADVIL;MOTRIN) 600 MG tablet Take 1 tablet by mouth 3 times daily as needed for Pain 90 tablet 0    aspirin 81 MG tablet Take 1 tablet by mouth daily. 30 tablet 3    PARoxetine (PAXIL) 20 MG tablet Take 1 tablet by mouth daily 30 tablet 3     No current facility-administered medications for this visit.         Social History     Tobacco Use    Smoking status: Former Smoker    Smokeless tobacco: Never Used   Substance Use Topics    Alcohol use: Yes     Comment: rarely    Drug use: No       Family History   Problem Relation Age of Onset    Osteoarthritis Mother     Other Mother         colostomy    Cancer Maternal Grandmother         Lung    Diabetes Maternal Grandfather       ________________________________________________________________________  Review of Systems:  CONSTITUTIONAL: Positive: fever, chills  PSYCH: Denies: anxiety, depression  ALLERGIES: Denies: urticaria  EYES: Denies: blurry vision, decreased vision, photophobia  ENT: Denies: sore throat, nasal congestion  CARDIOVASCULAR: Denies: chest pain, dyspnea on exertion  RESPIRATORY: Denies:  hemoptysis, shortness of breath, positive for productive cough, sore throat  GI: Denies: Denies: abdominal pain, flank pain  : Denies: Denies: dysuria, frequency/urgency  NEURO: Denies: dizzy/vertigo, headache  MUSCULOSKELETAL: positive for joint pain  SKIN: Denies: rash, itching  ________________________________________________________________________  Physical Exam:  Vitals:    05/07/19 1320   BP: 127/76   Site: Left Upper Arm   Position: Sitting   Cuff Size: Medium Adult   Pulse: 65   Weight: 207 lb (93.9 kg)   Height: 5' 4\" (1.626 m)     BP Readings from Last 3 Encounters:   05/07/19 127/76   04/26/19 126/77   08/31/18 137/83      General appearance - alert, well appearing, and in no distress  Mental status - alert, oriented to person, place, and time  Mouth - mucous membranes moist, pharynx normal without lesions  Chest - clear to auscultation, no wheezes, rales or rhonchi, symmetric air entry  Heart - normal rate, regular rhythm, normal S1, S2, no murmurs, rubs, clicks or gallops  Abdomen - soft, nontender, nondistended, no masses or organomegaly  Neurological - alert, oriented, normal speech, no focal findings or movement disorder noted  Extremities - peripheral pulses normal, no pedal edema, no clubbing or cyanosis    ________________________________________________________________________  Diagnostic findings:  CBC:  Lab Results   Component Value Date    WBC 6.5 05/03/2019    HGB 14.7 05/03/2019     05/03/2019       BMP:    Lab Results   Component Value Date     05/03/2019    K 4.4 05/03/2019     05/03/2019    CO2 24 05/03/2019    BUN 14 05/03/2019    CREATININE 0.70 05/03/2019    GLUCOSE 91 05/03/2019       HEMOGLOBIN A1C:   Lab Results   Component Value Date    LABA1C 5.2 05/03/2019       FASTING LIPID PANEL:  Lab Results   Component Value Date    CHOL 126 05/03/2019    HDL 36 (L) 05/03/2019    TRIG 86 05/03/2019     ________________________________________________________________________  Assessment and Plan:  Sudarshan Kapoor was seen today for skin tag and health maintenance. Diagnoses and all orders for this visit:    Skin tag  Referral to dermatology    Viral upper respiratory infection  Azithromycin 500 mg daily for 3 days    Health care maintenance  IFIT for colon Cancer screening    Right sided sciatica  Referral to physical therapy    ________________________________________________________________________  Follow up and instructions:  · No follow-ups on file.     · Sudarshan Parents received counseling on the following healthy behaviors: nutrition, exercise, medication adherence and decrease in alcohol consumption    · Discussed use, benefit, and side effects of prescribed medications. Barriers to medication compliance addressed. All patient questions answered. Pt voiced understanding. · Patient given educational materials - see patient instructions    Joceline Middleton  PGY 2, Internal Medicine   [facility]  5/7/2019, 1:45 PM    This note is created with the assistance of a speech-recognition program. While intending to generate a document that actually reflects the content of the visit, the document can still have some mistakes which may not have been identified and corrected by editing.

## 2019-05-07 NOTE — PROGRESS NOTES
Attending Physician Statement GC  I have discussed the care of Miguel Rodriguez, including pertinent history and exam findings with the resident. I have reviewed the key elements of all parts of the encounter with the resident. I have seen and examined the patient with the resident and the key elements of all parts of the encounter have been performed by me. Skin tags, wants removal-Derm referral  URI symptoms for over 3 weeks, not improving- Aizthromycin, guaifenesin DM, loratadine  Worsening sciatica symptoms- physical therapy referral  Was seen for fatigue last visit, no weight loss, basic w/u neg- will start multivitamin  Does not smoke anymore  Cologuard    Return in about 6 weeks (around 6/18/2019).       Marshal Robles MD

## 2019-07-15 ENCOUNTER — TELEPHONE (OUTPATIENT)
Dept: INTERNAL MEDICINE | Age: 54
End: 2019-07-15

## 2019-07-15 NOTE — LETTER
[address]  [address] [address]  Phone: [phone]  Fax: [phone]    Anjelica Charles MD        July 15, 2019    Amanda Brothers  [address] [place] [id number]      Dear Vickie Olvera: We were unable to reach you by phone. It is important that you contact us by calling [phone], at your earliest convenience. This message is regarding your cologuard test.      If you have any questions or concerns, please don't hesitate to call.     Sincerely,        Anjelica Charles MD

## 2019-07-26 ENCOUNTER — OFFICE VISIT (OUTPATIENT)
Dept: DERMATOLOGY | Age: 54
End: 2019-07-26
Payer: COMMERCIAL

## 2019-07-26 VITALS
HEART RATE: 66 BPM | SYSTOLIC BLOOD PRESSURE: 132 MMHG | WEIGHT: 209 LBS | BODY MASS INDEX: 35.68 KG/M2 | HEIGHT: 64 IN | OXYGEN SATURATION: 97 % | DIASTOLIC BLOOD PRESSURE: 80 MMHG

## 2019-07-26 DIAGNOSIS — L91.8 INFLAMED ACROCHORDON: Primary | ICD-10-CM

## 2019-07-26 DIAGNOSIS — L91.8 ACROCHORDON: ICD-10-CM

## 2019-07-26 PROCEDURE — 11200 RMVL SKIN TAGS UP TO&INC 15: CPT | Performed by: DERMATOLOGY

## 2019-09-27 ENCOUNTER — HOSPITAL ENCOUNTER (OUTPATIENT)
Age: 54
Setting detail: SPECIMEN
Discharge: HOME | End: 2019-09-27
Payer: COMMERCIAL

## 2019-09-27 ENCOUNTER — OFFICE VISIT (OUTPATIENT)
Dept: PRIMARY CARE CLINIC | Age: 54
End: 2019-09-27
Payer: COMMERCIAL

## 2019-09-27 VITALS
BODY MASS INDEX: 36.22 KG/M2 | WEIGHT: 211 LBS | DIASTOLIC BLOOD PRESSURE: 87 MMHG | TEMPERATURE: 97 F | HEART RATE: 71 BPM | SYSTOLIC BLOOD PRESSURE: 129 MMHG | OXYGEN SATURATION: 99 %

## 2019-09-27 DIAGNOSIS — R35.0 URINARY FREQUENCY: ICD-10-CM

## 2019-09-27 DIAGNOSIS — R35.0 URINARY FREQUENCY: Primary | ICD-10-CM

## 2019-09-27 LAB
BILIRUBIN URINE: NEGATIVE
BILIRUBIN, POC: NEGATIVE
BLOOD URINE, POC: NEGATIVE
CLARITY, POC: NORMAL
COLOR, POC: YELLOW
COLOR: YELLOW
COMMENT UA: NORMAL
GLUCOSE URINE, POC: NEGATIVE
GLUCOSE URINE: NEGATIVE
KETONES, POC: NEGATIVE
KETONES, URINE: NEGATIVE
LEUKOCYTE EST, POC: NEGATIVE
LEUKOCYTE ESTERASE, URINE: NEGATIVE
NITRITE, POC: NEGATIVE
NITRITE, URINE: NEGATIVE
PH UA: 5.5 (ref 5–8)
PH, POC: 6
PROTEIN UA: NEGATIVE
PROTEIN, POC: NEGATIVE
SPECIFIC GRAVITY UA: 1.02 (ref 1–1.03)
SPECIFIC GRAVITY, POC: 1.02
TURBIDITY: CLEAR
URINE HGB: NEGATIVE
UROBILINOGEN, POC: 0.2
UROBILINOGEN, URINE: NORMAL

## 2019-09-27 PROCEDURE — 81002 URINALYSIS NONAUTO W/O SCOPE: CPT | Performed by: INTERNAL MEDICINE

## 2019-09-27 PROCEDURE — 99213 OFFICE O/P EST LOW 20 MIN: CPT | Performed by: INTERNAL MEDICINE

## 2019-09-27 PROCEDURE — 3017F COLORECTAL CA SCREEN DOC REV: CPT | Performed by: INTERNAL MEDICINE

## 2019-09-27 PROCEDURE — G8427 DOCREV CUR MEDS BY ELIG CLIN: HCPCS | Performed by: INTERNAL MEDICINE

## 2019-09-27 PROCEDURE — G8417 CALC BMI ABV UP PARAM F/U: HCPCS | Performed by: INTERNAL MEDICINE

## 2019-09-27 PROCEDURE — 1036F TOBACCO NON-USER: CPT | Performed by: INTERNAL MEDICINE

## 2019-09-27 ASSESSMENT — ENCOUNTER SYMPTOMS: NAUSEA: 1

## 2019-11-06 ENCOUNTER — OFFICE VISIT (OUTPATIENT)
Dept: PRIMARY CARE CLINIC | Age: 54
End: 2019-11-06
Payer: COMMERCIAL

## 2019-11-06 ENCOUNTER — HOSPITAL ENCOUNTER (OUTPATIENT)
Age: 54
Discharge: HOME | End: 2019-11-08
Payer: COMMERCIAL

## 2019-11-06 ENCOUNTER — HOSPITAL ENCOUNTER (OUTPATIENT)
Dept: GENERAL RADIOLOGY | Age: 54
Discharge: HOME | End: 2019-11-08
Payer: COMMERCIAL

## 2019-11-06 VITALS
HEART RATE: 76 BPM | WEIGHT: 219 LBS | DIASTOLIC BLOOD PRESSURE: 71 MMHG | TEMPERATURE: 97.5 F | SYSTOLIC BLOOD PRESSURE: 118 MMHG | BODY MASS INDEX: 37.59 KG/M2

## 2019-11-06 DIAGNOSIS — M54.50 ACUTE RIGHT-SIDED LOW BACK PAIN WITHOUT SCIATICA: Primary | ICD-10-CM

## 2019-11-06 DIAGNOSIS — M54.50 ACUTE RIGHT-SIDED LOW BACK PAIN WITHOUT SCIATICA: ICD-10-CM

## 2019-11-06 DIAGNOSIS — R35.0 FREQUENCY OF URINATION: ICD-10-CM

## 2019-11-06 LAB
BILIRUBIN, POC: NEGATIVE
BLOOD URINE, POC: NEGATIVE
CLARITY, POC: CLEAR
COLOR, POC: YELLOW
GLUCOSE URINE, POC: NEGATIVE
KETONES, POC: NEGATIVE
LEUKOCYTE EST, POC: NEGATIVE
NITRITE, POC: NEGATIVE
PH, POC: 6
PROTEIN, POC: NEGATIVE
SPECIFIC GRAVITY, POC: 1.02
UROBILINOGEN, POC: NEGATIVE

## 2019-11-06 PROCEDURE — G8427 DOCREV CUR MEDS BY ELIG CLIN: HCPCS | Performed by: NURSE PRACTITIONER

## 2019-11-06 PROCEDURE — 81002 URINALYSIS NONAUTO W/O SCOPE: CPT | Performed by: NURSE PRACTITIONER

## 2019-11-06 PROCEDURE — G8484 FLU IMMUNIZE NO ADMIN: HCPCS | Performed by: NURSE PRACTITIONER

## 2019-11-06 PROCEDURE — 99202 OFFICE O/P NEW SF 15 MIN: CPT | Performed by: NURSE PRACTITIONER

## 2019-11-06 PROCEDURE — 1036F TOBACCO NON-USER: CPT | Performed by: NURSE PRACTITIONER

## 2019-11-06 PROCEDURE — 3017F COLORECTAL CA SCREEN DOC REV: CPT | Performed by: NURSE PRACTITIONER

## 2019-11-06 PROCEDURE — 72100 X-RAY EXAM L-S SPINE 2/3 VWS: CPT

## 2019-11-06 PROCEDURE — G8417 CALC BMI ABV UP PARAM F/U: HCPCS | Performed by: NURSE PRACTITIONER

## 2019-11-06 RX ORDER — IBUPROFEN 800 MG/1
800 TABLET ORAL EVERY 8 HOURS PRN
Qty: 60 TABLET | Refills: 0 | Status: SHIPPED | OUTPATIENT
Start: 2019-11-06 | End: 2020-12-08

## 2019-11-06 RX ORDER — CYCLOBENZAPRINE HCL 5 MG
5 TABLET ORAL NIGHTLY PRN
Qty: 20 TABLET | Refills: 0 | Status: SHIPPED | OUTPATIENT
Start: 2019-11-06 | End: 2019-11-16

## 2019-11-06 ASSESSMENT — ENCOUNTER SYMPTOMS
NAUSEA: 0
SHORTNESS OF BREATH: 0
VOMITING: 0
BACK PAIN: 1
ABDOMINAL PAIN: 0
DIARRHEA: 0
COUGH: 0
SORE THROAT: 0
SINUS PAIN: 0

## 2020-12-08 ENCOUNTER — TELEPHONE (OUTPATIENT)
Dept: INTERNAL MEDICINE | Age: 55
End: 2020-12-08

## 2020-12-08 ENCOUNTER — OFFICE VISIT (OUTPATIENT)
Dept: INTERNAL MEDICINE | Age: 55
End: 2020-12-08
Payer: COMMERCIAL

## 2020-12-08 ENCOUNTER — NURSE TRIAGE (OUTPATIENT)
Dept: OTHER | Facility: CLINIC | Age: 55
End: 2020-12-08

## 2020-12-08 VITALS
HEART RATE: 79 BPM | DIASTOLIC BLOOD PRESSURE: 71 MMHG | WEIGHT: 221 LBS | SYSTOLIC BLOOD PRESSURE: 138 MMHG | HEIGHT: 64 IN | BODY MASS INDEX: 37.73 KG/M2

## 2020-12-08 PROCEDURE — 99211 OFF/OP EST MAY X REQ PHY/QHP: CPT | Performed by: INTERNAL MEDICINE

## 2020-12-08 PROCEDURE — G8427 DOCREV CUR MEDS BY ELIG CLIN: HCPCS | Performed by: STUDENT IN AN ORGANIZED HEALTH CARE EDUCATION/TRAINING PROGRAM

## 2020-12-08 PROCEDURE — 1036F TOBACCO NON-USER: CPT | Performed by: STUDENT IN AN ORGANIZED HEALTH CARE EDUCATION/TRAINING PROGRAM

## 2020-12-08 PROCEDURE — 99213 OFFICE O/P EST LOW 20 MIN: CPT | Performed by: STUDENT IN AN ORGANIZED HEALTH CARE EDUCATION/TRAINING PROGRAM

## 2020-12-08 PROCEDURE — G8484 FLU IMMUNIZE NO ADMIN: HCPCS | Performed by: STUDENT IN AN ORGANIZED HEALTH CARE EDUCATION/TRAINING PROGRAM

## 2020-12-08 PROCEDURE — G8417 CALC BMI ABV UP PARAM F/U: HCPCS | Performed by: STUDENT IN AN ORGANIZED HEALTH CARE EDUCATION/TRAINING PROGRAM

## 2020-12-08 PROCEDURE — 3017F COLORECTAL CA SCREEN DOC REV: CPT | Performed by: STUDENT IN AN ORGANIZED HEALTH CARE EDUCATION/TRAINING PROGRAM

## 2020-12-08 RX ORDER — MECLIZINE HCL 12.5 MG/1
12.5 TABLET ORAL 3 TIMES DAILY PRN
Qty: 15 TABLET | Refills: 1 | Status: SHIPPED | OUTPATIENT
Start: 2020-12-08 | End: 2020-12-18

## 2020-12-08 ASSESSMENT — PATIENT HEALTH QUESTIONNAIRE - PHQ9
SUM OF ALL RESPONSES TO PHQ9 QUESTIONS 1 & 2: 0
2. FEELING DOWN, DEPRESSED OR HOPELESS: 0
SUM OF ALL RESPONSES TO PHQ QUESTIONS 1-9: 0
SUM OF ALL RESPONSES TO PHQ QUESTIONS 1-9: 0
1. LITTLE INTEREST OR PLEASURE IN DOING THINGS: 0
SUM OF ALL RESPONSES TO PHQ QUESTIONS 1-9: 0

## 2020-12-08 NOTE — PROGRESS NOTES
[facility]    Progress Note    Date of patient's visit: 12/8/2020  YOB: 1965  Patient's Name:  Brian Ramos    Patient Care Team:  Soraya Mcwilliams MD as PCP - General (Internal Medicine)    REASON FOR VISIT: Routine outpatient follow     HISTORY OF PRESENT ILLNESS:    History was obtained from the patient. Brian Ramos is a 54 y.o. is here for same-day visit for vertigo. Patient states that she had issues with vertigo and dizziness in the past and went for therapy which helped her vertigo. Today she was lying on her right side on the sofa and when she got up she suddenly started feeling dizzy. She has issues on the same side previously as well. Patient does not report any noticeable hearing loss but did state that her family notices that she has a hard time understanding what they say. She also reports occasional ringing in the ear. She has not had a hearing test in a while. Patient is agreeable for ENT follow up. No history of cough, shortness of breath, upper respiratory symptoms. Patient is otherwise doing well. Blood pressure 120/86. Patient is not on any blood pressure medications. Former smoker  Declined all health maintenance today. Patient Active Problem List   Diagnosis    Iron deficiency anemia    Heterozygous factor V Leiden mutation ([facility] [address]    Obesity (BMI 30-39. 9)    Warfarin anticoagulation    GERD (gastroesophageal reflux disease)    Anxiety    Dyslipidemia (high LDL; low HDL)    Vertigo       ALLERGIES    No Known Allergies      MEDICATIONS:      Current Outpatient Medications on File Prior to Visit   Medication Sig Dispense Refill    Multiple Vitamins-Minerals (MULTIVITAMIN WITH MINERALS) tablet Take 1 tablet by mouth daily 30 tablet 3    aspirin 81 MG tablet Take 1 tablet by mouth daily. 30 tablet 3     No current facility-administered medications on file prior to visit.       SOCIAL HISTORY    Reviewed and no change from previous record. Irma Malou  reports that she quit smoking about 30 years ago. Her smoking use included cigarettes. She smoked 0.25 packs per day. She has never used smokeless tobacco.    FAMILY HISTORY:    Reviewed and No change from previous visit    REVIEW OF SYSTEMS:    ENT:positive for hearing loss  Respiratory: no cough, shortness of breath, or wheezing  Cardiovascular: no chest pain or dyspnea on exertion  Gastrointestinal: no abdominal pain, black or bloody stools  Neurological: no TIA or stroke symptoms  Endocrine: negative  Genito-Urinary: no dysuria, trouble voiding, or hematuria  Musculoskeletal: negative  Dermatological: negative    PHYSICAL EXAM:      Vitals:    12/08/20 1322   BP: 138/71   Pulse: 79     Physical Exam   Constitutional: She appears well-developed. HENT:   Head: Normocephalic and atraumatic. Minimal wax both ears  epley meneuver positive on rt side  Dizziness reproducible   Eyes: Pupils are equal, round, and reactive to light. Neck: Normal range of motion. Cardiovascular: Normal rate and regular rhythm. Musculoskeletal: Normal range of motion.        LABORATORY FINDINGS:    CBC:  Lab Results   Component Value Date    WBC 6.5 05/03/2019    HGB 14.7 05/03/2019     05/03/2019     BMP:    Lab Results   Component Value Date     05/03/2019    K 4.4 05/03/2019     05/03/2019    CO2 24 05/03/2019    BUN 14 05/03/2019    CREATININE 0.70 05/03/2019    GLUCOSE 91 05/03/2019     HEMOGLOBIN A1C:   Lab Results   Component Value Date    LABA1C 5.2 05/03/2019     MICROALBUMIN URINE: No results found for: MICROALBUR  FASTING LIPID PANEL:  Lab Results   Component Value Date    CHOL 126 05/03/2019    HDL 36 (L) 05/03/2019    TRIG 86 05/03/2019     LIVER PROFILE:  Lab Results   Component Value Date    ALT 30 05/03/2019    AST 28 05/03/2019    PROT 7.2 05/03/2019    BILITOT 0.31 05/03/2019    BILIDIR 0.07 03/25/2013    LABALBU 4.1 05/03/2019      THYROID FUNCTION:   Lab Results   Component Value Date    TSH 1.37 05/03/2019      URINE ANALYSIS: No results found for: LABURIN  ASSESSMENT AND PLAN:    1. BPV (benign positional vertigo), right    - AFL - Ingris Huntley MD, Otolaryngology, [facility]  - meclizine (ANTIVERT) 12.5 MG tablet; Take 1 tablet by mouth 3 times daily as needed for Dizziness  Dispense: 15 tablet; Refill: 1  - [place]    2. Heterozygous factor V Leiden mutation ([facility] [address]  stable  Not on anticoagulation    3. Tinnitus of both ears  Patient will need hearing test      FOLLOW UP:       1. Hannah Cespedes received counseling on the following healthy behaviors: exercise and medication adherence  2. Patient given educational materials - see patient instructions  3. Discussed use, benefit, and side effects of prescribed medications. Barriers to medication compliance addressed. All patient questions answered. Pt voiced understanding. 4.  Reviewed prior labs and health maintenance  5. Continue current medications, diet and exercise.     Pradip Wilson MD  INTERNAL MEDICINE RESIDENT, PGY-2  [address] [place], [place]  12/8/2020,1:49 PM

## 2020-12-08 NOTE — TELEPHONE ENCOUNTER
Reason for Disposition   Patient wants to be seen    Answer Assessment - Initial Assessment Questions  1. DESCRIPTION: \"Describe your dizziness. \"      Just really dizzy when I move    2. LIGHTHEADED: \"Do you feel lightheaded? \" (e.g., somewhat faint, woozy, weak upon standing)  Yes    3. VERTIGO: \"Do you feel like either you or the room is spinning or tilting? \" (i.e. vertigo)    No    4. SEVERITY: \"How bad is it? \"  \"Do you feel like you are going to faint? \" \"Can you stand and walk? \"    - MILD - walking normally    - MODERATE - interferes with normal activities (e.g., work, school)     - SEVERE - unable to stand, requires support to walk, feels like passing out now.    mild    5. ONSET:  \"When did the dizziness begin? \"  1 hour ago    6. AGGRAVATING FACTORS: \"Does anything make it worse? \" (e.g., standing, change in head position)   fast movement    7. HEART RATE: \"Can you tell me your heart rate? \" \"How many beats in 15 seconds? \"  (Note: not all patients can do this)     no    8. CAUSE: \"What do you think is causing the dizziness? \"    The way she slept, slept on right side    9. RECURRENT SYMPTOM: \"Have you had dizziness before? \" If so, ask: \"When was the last time? \" \"What happened that time? \"  Yes, had vertigo a few years ago and has seen a therapist for it    10. OTHER SYMPTOMS: \"Do you have any other symptoms? \" (e.g., fever, chest pain, vomiting, diarrhea, bleeding)    Nausea    11. PREGNANCY: \"Is there any chance you are pregnant? \" \"When was your last menstrual period? \"    no    Protocols used: DIZZINESS-ADULT-OH  Patient called pre-service [facility] with red flag complaint. Brief description of triage: dizziness, see assessment. Triage indicates for patient to be seen today. Care advice provided, patient verbalizes understanding; denies any other questions or concerns; instructed to call back for any new or worsening symptoms. Message send to . Attention Provider:  Thank you for allowing me to participate in the care of your patient. The patient was connected to triage in response to information provided to the ECC. Please do not respond through this encounter as the response is not directed to a shared pool.

## 2020-12-08 NOTE — PATIENT INSTRUCTIONS
tinnitus. If you can't avoid loud noises, wear earplugs or earmuffs. · Ignore the sound by paying attention to other things. · Relax using biofeedback, meditation, or yoga. Feeling stressed and being tired can make tinnitus worse. · Play music or white noise to help you sleep. Background noise may cover up the noise that you hear in your ears. You can buy a machine that makes soothing sounds, such as ocean waves. When should you call for help? Call 911 anytime you think you may need emergency care. For example, call if:    · You have symptoms of a stroke. These may include:  ? Sudden numbness, tingling, weakness, or loss of movement in your face, arm, or leg, especially on only one side of your body. ? Sudden vision changes. ? Sudden trouble speaking. ? Sudden confusion or trouble understanding simple statements. ? Sudden problems with walking or balance. ? A sudden, severe headache that is different from past headaches. Call your doctor now or seek immediate medical care if:    · You develop other symptoms. These may include hearing loss (or worse hearing loss), balance problems, dizziness, nausea, or vomiting. Watch closely for changes in your health, and be sure to contact your doctor if:    · Your tinnitus moves from both ears to one ear.     · Your hearing loss gets worse within 1 day after an ear injury.     · Your tinnitus or hearing loss does not get better within 1 week after an ear injury.     · Your tinnitus bothers you enough that you want to take medicines to help you cope with it. Where can you learn more? Go to https://Fidelis Security Systemscindy.LimeSpot Solutions. org and sign in to your Shutter Guardian account. Enter S165 in the [employer] box to learn more about \"Tinnitus: Care Instructions. \"     If you do not have an account, please click on the \"Sign Up Now\" link. Current as of: April 15, 2020               Content Version: 12.6  © 5065-5954 [employer]ap, Incorporated.    Care instructions adapted under license by [facility] ([facility]). If you have questions about a medical condition or this instruction, always ask your healthcare professional. [address] any warranty or liability for your use of this information. Patient Education        Benign Paroxysmal Positional Vertigo (BPPV): Care Instructions  Your Care Instructions     Benign paroxysmal positional vertigo, also called BPPV, is an inner ear problem. It causes a spinning or whirling sensation when you move your head. This sensation is called vertigo. The vertigo usually lasts for less than a minute. People often have vertigo spells for a few days or weeks. Then the vertigo goes away. But it may come back again. The vertigo may be mild, or it may be bad enough to cause unsteadiness, nausea, and vomiting. When you move, your inner ear sends messages to the brain. This helps you keep your balance. Vertigo can happen when debris builds up in the inner ear. The buildup can cause the inner ear to send the wrong message to the brain. Your doctor may move you in different positions to help your vertigo get better faster. This is called the Epley maneuver. Your doctor may also prescribe medicines or exercises to help with your symptoms. Follow-up care is a key part of your treatment and safety. Be sure to make and go to all appointments, and call your doctor if you are having problems. It's also a good idea to know your test results and keep a list of the medicines you take. How can you care for yourself at home? · If your doctor suggests that you do Bolnaos-Daroff exercises:  ? Sit on the edge of a bed or sofa. Quickly lie down on the side that causes the worst vertigo. Lie on your side with your ear down. ? Stay in this position for at least 30 seconds or until the vertigo goes away. ? Sit up. If this causes vertigo, wait for it to stop. ? Repeat the procedure on the other side. ? Repeat this 10 times.  Do these exercises 2 times a day until the vertigo is gone. When should you call for help? Call 911 anytime you think you may need emergency care. For example, call if:    · You have symptoms of a stroke. These may include:  ? Sudden numbness, tingling, weakness, or loss of movement in your face, arm, or leg, especially on only one side of your body. ? Sudden vision changes. ? Sudden trouble speaking. ? Sudden confusion or trouble understanding simple statements. ? Sudden problems with walking or balance. ? A sudden, severe headache that is different from past headaches. Call your doctor now or seek immediate medical care if:    · You have new or worse nausea and vomiting.     · You have new symptoms such as hearing loss or roaring in your ears. Watch closely for changes in your health, and be sure to contact your doctor if:    · You are not getting better as expected.     · Your vertigo gets worse. Where can you learn more? Go to https://Cause.it.Nexavis. org and sign in to your Reading Rainbow account. Enter  in the CEYX box to learn more about \"Benign Paroxysmal Positional Vertigo (BPPV): Care Instructions. \"     If you do not have an account, please click on the \"Sign Up Now\" link. Current as of: April 15, 2020               Content Version: 12.6  © 9754-8194 [employer], Incorporated. Care instructions adapted under license by [facility] ([facility]). If you have questions about a medical condition or this instruction, always ask your healthcare professional. [place] [address] any warranty or liability for your use of this information. Medications e-scribe to pharmacy of pt's choice. Referral to OT and otolaryngology  was placed, summary of care printed and faxed to office, phone numbers given to the patient, they will contact office for an appt    Return To Clinic 1/25/2021. After Visit Summary  given and reviewed.      It is very important for your care that you keep your appointment. If for some reason you are unable to keep your appointment it is equally important that you call our office at [phone] to cancel your appointment and reschedule. Failure to do so may result in your termination from our practice.     SL

## 2020-12-10 ENCOUNTER — TELEPHONE (OUTPATIENT)
Dept: INTERNAL MEDICINE | Age: 55
End: 2020-12-10

## 2020-12-10 NOTE — TELEPHONE ENCOUNTER
PC from Allison Franco from [id number] - states patient was referred to them for vertigo but order was placed for occupational therapy and needs to be for physical therapy. Order pended.

## 2020-12-11 ENCOUNTER — HOSPITAL ENCOUNTER (OUTPATIENT)
Dept: PHYSICAL THERAPY | Facility: CLINIC | Age: 55
Setting detail: THERAPIES SERIES
Discharge: HOME | End: 2020-12-11
Payer: COMMERCIAL

## 2020-12-11 PROCEDURE — 97110 THERAPEUTIC EXERCISES: CPT

## 2020-12-11 PROCEDURE — 97161 PT EVAL LOW COMPLEX 20 MIN: CPT

## 2020-12-11 NOTE — CONSULTS
[] [address]  [address]  [phone]  F: [phone] [x] [address]  [facility]   [address]  P: [phone]  F: [phone] [] [place]  [address] [facility]  P: [phone]  F: [phone] [] [place]  [address]  [address]  P: [phone]  F: [phone]     [] [address]  [facility]   [address]  [place]: [phone]  F: [phone]     Physical Therapy Vestibular Rehab Evaluation    Date:  2020  Patient: Priya Park  : 1965  MRN: [id number]  Physician: Igor Khanna MD  Insurance: [facility]  Medical Diagnosis: H81.11- benign paroxysmal positional vertigo, right  Rehab Codes: H81.11  Onset date: 2020  Next Dr's appt. : 2021    Subjective:   CC:     \"Sometimes when I look down then up- that's it- the room started spinning [while at work yesterday]. \" Relief with utilizing Meclizine yesterday. Also reports symptoms a few days earlier as well. Also reports symptoms with bending forward and looking under couch. Symptoms with rolling R in bed. Reports relief with previous vestibular PT services. Reports plans to visit ENT soon- also reports B tinnitus and ear pressure. Denies all other recent LOB, falls, accidents, injuries. HPI: 2020; see above     PMHx:    [x] Unremarkable [] Diabetes  []MI/Heart Problems   [] Pacemaker  [] HTN   [] Cancer   [x] Arthritis:   [x] Surgeries: tubal x25 years ago; gall bladder removal x20 years ago; carpal tunnel. [x] Other: panic attacks. Test: [] X-Ray  [] MRI- nothing recent to head/cervical spine.    Allergies: [x] NKA  [] Refer to intake sheet  Medication: [x] Refer to intake sheet  [] None Smooth Pursuit N        Ocular ROM N        Saccades N        Convergence    N/A N/A -   VOR Slow N N/A N/A N/A N/A    VOR Cancellation N N/A N/A N/A N/A    Head Thrust N   N/A N/A    Static Visual Acuity  N/A N/A N/A N/A -   Dynamic Visual Acuity WNL    >3 Line difference    Muscle Guard N/A N/A N/A N/A -   Valsalva N          Oculomotor Tests- Without Fixation (Vision Blocked):    Test Result Description   Spontaneous Nystagmus N    Gaze Hold Nystagmus N Symptoms with R/L gaze- dizziness    Valsalva N    Head-Shaking Nystagmus N      Positional Tests:    Test R Torsional Upbeat L Torsional Upbeat Up Beat Down Beat Horizontal Duration Symptoms   [place] Hallpike- R - - - - - - -   Xi Hallpike- L - - - - - - -   Roll Test - - - - - - -     Pt reports symptoms upon immediate supine during first trial of R xi hallpike, however, eyes remain closed and she sits up abruptly- becomes emotionally labile. Pt then requests to repeat R xi hallpike immediately- no nystagmus or symptoms present. Performed a third trial at [facility], again, no nystagmus or symptoms. Negative for nystagmus and symptoms with all other positional tests- including roll test with both passive head motion and also rolling of pt to either side. Positional Treatments: NOT APPROPRIATE TODAY    BPPV Type Treatment Technique Trials Result Other:                                 MSQ [Motion Sensitivity Quotient]: POTENTIAL IN FUTURE    Problems:                                                                                     [x]  1. Vertigo  []  2. Difficulty walking a straight course                                    [x]  3. Positive Xi Hallpike- only symptoms on first attempt                                     []  4.  Static balance deficit: mCTSIB  []  5. Dynamic balance deficit: Benítez Balance Scale (BBS), Dynamic Gait Index (DGI), Functional Gait Assessment (FGA)  [x]  6. Increased Dizziness Handicap Inventory ([address] [place] [address])   []  7. Increased Post Concussion Symptom Survey (PCSS)  []  8. Increased Brain Injury Vision Symptom Survey (BIVSS)      Short Term Goals: (     6        Treatments)  [x] 1. Decreased Vertigo  [] 2. Improved gait mechanics, trajectory  [x] 3. Negative [place] Hallpike- potential   [] 4. Improved static balance  [] 5. Improved dynamic balance  [x] 6. Decreased Dizziness Handicap Inventory ([facility] [address])- < 24/100  [] 7. Decreased Post Concussion Symptom Survey (PCSS)  [] 8. Decreased Brain Injury Vision Symptom Survey (BIVSS)  [x] 9. Independent with Home Exercise Program (HEP)  [] 10. Demonstrate knowledge of fall prevention  11. Will deny symptoms with bending forward, looking under couch, and rolling R in bed in order to improve QOL. Patient Goals: \"stop the dizziness. \"     Assessment: Patient is a 54 y.o. pleasant female who presents to physical therapy initial evaluation with signs and symptoms consistent with potential R posterior canal canalithiasis, which has now since resolved- potential motion sensitivity. Patient demonstrates impairments and symptoms as detailed below in therapy goals. Patient currently limited in bending forward, looking under couch, rolling to R secondary to these impairments and increased symptoms. Patient would benefit from continued physical therapy services in order to address these impairments and symptoms, and return to QOL, ADLs, work. Anticipated frequency detailed above. Prognosis limited secondary to previous episode of BPPV x2 years ago- however relief with vestibular PT services- justifying a low complexity evaluation. If unable to achieve significant improvements within six visits, will consult referring physician and consider a follow-up visit with said physician. Patient verbalized understanding and agreement to all aforementioned statements. Unable to reproduce nystagmus and symptoms today- did not provide treatment- will re-check next week.      Rehab Potential: [x] Good  [x] Fair  [] Poor  Suggested Professional Referral:  [x] No  [] Yes  Barriers to Goal Achievement: [] No  [x] Yes        If yes: previous episode of BPPV x2 years ago- however relief with vestibular PT services. Domestic Concerns: [x] No  [] Yes     If yes:    Treatment Plan:  [x] Therapeutic Exercise   43780  [] Iontophoresis: 4 mg/mL Dexamethasone Sodium Phosphate  mAmin  61825   [] Therapeutic Activity  13874 [] Vasopneumatic cold with compression  45293    [] Gait Training   58238 [] Ultrasound   40087   [] Neuromuscular Re-education  34782 [] Electrical Stimulation Unattended  79824   [] Manual Therapy  73046 [] Electrical Stimulation Attended  03916   [x] Instruction in HEP  [] Lumbar/Cervical Traction  86255   [] Aquatic Therapy   55096 [] Cold/hotpack    [] Massage   15968      [] Dry Needling, 1 or 2 muscles  53252   [] Biofeedback, first 15 minutes   45136  [] Biofeedback, additional 15 minutes   37227 [] Dry Needling, 3 or more muscles  88421     []  Medication allergies reviewed for use of    Dexamethasone Sodium Phosphate 4mg/ml     with iontophoresis treatments. Pt is not allergic. Other: X- CRT- [id number]. Frequency: 1 X/wk  x 6 visits      [x] Plans/Goals, Risk/Benefits discussed with pt/family      Pt/Family Education: [x] Verbal  [] Demo  [] Written    Comprehension of Education:  [] Verbalizes understanding. [] Demonstrates understanding. [] Needs Review. [] Demonstrates/verbalizes understanding of HEP/Ed previously given.     Todays Treatment:  Modalities:   Precautions:  Exercises:  Exercise Reps/ Time Weight/ Level Comments   Education- extensive      Vestibular evaluation- objective tests and measures- rationale, expectations, results; potential R posterior canal canalithiasis resolved with performing R peña hallpike; not appropriate for treatment today as nystagmus nor symptoms were present during two additional attempts at R peña hallpike, but also, all other positional tests; may perform post-CRT positioning precautions until re-check next week; may utilize Meclizine prior to next appointment- verbalized understanding to all. Other:     Specific Instructions for next treatment: Re-assess for BPPV within all canals- consider modified S/L test. If unable to reproduce nystagmus or symptoms with positional tests, consider MSQ and habituation interventions next visit. Evaluation Complexity:  History (Personal factors, comorbidities) [] 0 [x] 1-2 [] 3+   Exam (limitations, restrictions) [x] 1-2 [] 3 [] 4+   Clinical presentation (progression) [x] Stable [] Evolving  [] Unstable   Decision Making [x] Low [] Moderate [] High     [x] Low Complexity [] Moderate Complexity [] High Complexity     Today's Treatment Charges        Mins       Units   [x] PT Evaluation- [] Low; [] Moderate; [] High  50 1   [] Canalith repositioning maneuver/technique (CRM/T)     [x] Therapeutic Exercise 15min 10 1     TOTAL TREATMENT TIME: 60    Start Time: 2:05PM             Stop Time: 3:05PM     More objective information is available upon request.  Thank you for this referral.    Electronically signed by: Tom Cota PT, DPT    Physician Signature:________________________________Date:__________________  By signing above or cosigning this note, I have reviewed this plan of care and certify a need for medically necessary rehabilitation services.       *PLEASE SIGN ABOVE AND FAX BACK ALL PAGES*

## 2021-12-02 ENCOUNTER — NURSE TRIAGE (OUTPATIENT)
Dept: OTHER | Facility: CLINIC | Age: 56
End: 2021-12-02

## 2021-12-02 ENCOUNTER — OFFICE VISIT (OUTPATIENT)
Dept: INTERNAL MEDICINE | Age: 56
End: 2021-12-02
Payer: COMMERCIAL

## 2021-12-02 VITALS
HEIGHT: 64 IN | DIASTOLIC BLOOD PRESSURE: 76 MMHG | SYSTOLIC BLOOD PRESSURE: 123 MMHG | HEART RATE: 75 BPM | WEIGHT: 221.6 LBS | BODY MASS INDEX: 37.83 KG/M2 | TEMPERATURE: 98.3 F
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
## 2021-12-02 DIAGNOSIS — S16.1XXA STRAIN OF NECK MUSCLE, INITIAL ENCOUNTER: ICD-10-CM

## 2021-12-02 DIAGNOSIS — R42 VERTIGO: ICD-10-CM

## 2021-12-02 DIAGNOSIS — S39.012A STRAIN OF LUMBAR PARASPINAL MUSCLE, INITIAL ENCOUNTER: ICD-10-CM

## 2021-12-02 PROCEDURE — 99211 OFF/OP EST MAY X REQ PHY/QHP: CPT | Performed by: INTERNAL MEDICINE

## 2021-12-02 PROCEDURE — G8484 FLU IMMUNIZE NO ADMIN: HCPCS | Performed by: STUDENT IN AN ORGANIZED HEALTH CARE EDUCATION/TRAINING PROGRAM

## 2021-12-02 PROCEDURE — 99213 OFFICE O/P EST LOW 20 MIN: CPT | Performed by: STUDENT IN AN ORGANIZED HEALTH CARE EDUCATION/TRAINING PROGRAM

## 2021-12-02 PROCEDURE — 3017F COLORECTAL CA SCREEN DOC REV: CPT | Performed by: STUDENT IN AN ORGANIZED HEALTH CARE EDUCATION/TRAINING PROGRAM

## 2021-12-02 PROCEDURE — G8427 DOCREV CUR MEDS BY ELIG CLIN: HCPCS | Performed by: STUDENT IN AN ORGANIZED HEALTH CARE EDUCATION/TRAINING PROGRAM

## 2021-12-02 PROCEDURE — 1036F TOBACCO NON-USER: CPT | Performed by: STUDENT IN AN ORGANIZED HEALTH CARE EDUCATION/TRAINING PROGRAM

## 2021-12-02 PROCEDURE — G8417 CALC BMI ABV UP PARAM F/U: HCPCS | Performed by: STUDENT IN AN ORGANIZED HEALTH CARE EDUCATION/TRAINING PROGRAM

## 2021-12-02 RX ORDER — IBUPROFEN 600 MG/1
600 TABLET ORAL 4 TIMES DAILY PRN
Qty: 40 TABLET | Refills: 0 | Status: SHIPPED | OUTPATIENT
Start: 2021-12-02 | End: 2021-12-12

## 2021-12-02 SDOH — ECONOMIC STABILITY: TRANSPORTATION INSECURITY
IN THE PAST 12 MONTHS, HAS LACK OF TRANSPORTATION KEPT YOU FROM MEETINGS, WORK, OR FROM GETTING THINGS NEEDED FOR DAILY LIVING?: NO

## 2021-12-02 SDOH — ECONOMIC STABILITY: FOOD INSECURITY: WITHIN THE PAST 12 MONTHS, YOU WORRIED THAT YOUR FOOD WOULD RUN OUT BEFORE YOU GOT MONEY TO BUY MORE.: NEVER TRUE

## 2021-12-02 SDOH — ECONOMIC STABILITY: TRANSPORTATION INSECURITY
IN THE PAST 12 MONTHS, HAS THE LACK OF TRANSPORTATION KEPT YOU FROM MEDICAL APPOINTMENTS OR FROM GETTING MEDICATIONS?: NO

## 2021-12-02 SDOH — ECONOMIC STABILITY: FOOD INSECURITY: WITHIN THE PAST 12 MONTHS, THE FOOD YOU BOUGHT JUST DIDN'T LAST AND YOU DIDN'T HAVE MONEY TO GET MORE.: NEVER TRUE

## 2021-12-02 ASSESSMENT — PATIENT HEALTH QUESTIONNAIRE - PHQ9
SUM OF ALL RESPONSES TO PHQ QUESTIONS 1-9: 0
1. LITTLE INTEREST OR PLEASURE IN DOING THINGS: 0
SUM OF ALL RESPONSES TO PHQ QUESTIONS 1-9: 0
SUM OF ALL RESPONSES TO PHQ QUESTIONS 1-9: 0
SUM OF ALL RESPONSES TO PHQ9 QUESTIONS 1 & 2: 0
2. FEELING DOWN, DEPRESSED OR HOPELESS: 0

## 2021-12-02 ASSESSMENT — SOCIAL DETERMINANTS OF HEALTH (SDOH): HOW HARD IS IT FOR YOU TO PAY FOR THE VERY BASICS LIKE FOOD, HOUSING, MEDICAL CARE, AND HEATING?: NOT HARD AT ALL
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
## 2021-12-02 NOTE — PATIENT INSTRUCTIONS
Return To Clinic 1/12/2022 for transfer of care. Please bring all of your medications with you to this appointment. After Visit Summary given and reviewed. The medication list included in this document is our record of what you are currently taking, including any changes that were made at today's visit. If you find any differences when compared to your medications at home, or have any questions that were not answered at your visit, please contact the office. It is very important for your care that you keep your appointment. If for some reason you are unable to keep your appointment, it is equally important that you call our office at [phone] to cancel your appointment and reschedule. Failure to do so may result in your termination from our practice. External referral for ENT given to patient. Patient is advised to contact insurance company for a list of specialists within network to be scheduled. Medications have been e-scribed to pharmacy of patients choice.      -YANN Strong
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
## 2022-05-16 ENCOUNTER — APPOINTMENT (OUTPATIENT)
Dept: CT IMAGING | Age: 57
End: 2022-05-16
Payer: OTHER MISCELLANEOUS

## 2022-05-16 ENCOUNTER — HOSPITAL ENCOUNTER (EMERGENCY)
Age: 57
Discharge: HOME | End: 2022-05-16
Attending: EMERGENCY MEDICINE
Payer: OTHER MISCELLANEOUS

## 2022-05-16 ENCOUNTER — APPOINTMENT (OUTPATIENT)
Dept: GENERAL RADIOLOGY | Age: 57
End: 2022-05-16
Payer: OTHER MISCELLANEOUS

## 2022-05-16 VITALS
TEMPERATURE: 97 F | DIASTOLIC BLOOD PRESSURE: 82 MMHG | OXYGEN SATURATION: 99 % | HEART RATE: 87 BPM | RESPIRATION RATE: 21 BRPM | SYSTOLIC BLOOD PRESSURE: 148 MMHG
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
## 2022-05-16 PROCEDURE — 73030 X-RAY EXAM OF SHOULDER: CPT

## 2022-05-16 PROCEDURE — 99284 EMERGENCY DEPT VISIT MOD MDM: CPT

## 2022-05-16 PROCEDURE — 6370000000 HC RX 637 (ALT 250 FOR IP): Performed by: STUDENT IN AN ORGANIZED HEALTH CARE EDUCATION/TRAINING PROGRAM

## 2022-05-16 PROCEDURE — 72131 CT LUMBAR SPINE W/O DYE: CPT

## 2022-05-16 PROCEDURE — 71045 X-RAY EXAM CHEST 1 VIEW: CPT

## 2022-05-16 PROCEDURE — 72125 CT NECK SPINE W/O DYE: CPT

## 2022-05-16 PROCEDURE — 93005 ELECTROCARDIOGRAM TRACING: CPT | Performed by: STUDENT IN AN ORGANIZED HEALTH CARE EDUCATION/TRAINING PROGRAM

## 2022-05-16 PROCEDURE — 73502 X-RAY EXAM HIP UNI 2-3 VIEWS: CPT

## 2022-05-16 RX ORDER — CYCLOBENZAPRINE HCL 10 MG
10 TABLET ORAL 3 TIMES DAILY PRN
Qty: 15 TABLET | Refills: 0 | Status: SHIPPED | OUTPATIENT
Start: 2022-05-16 | End: 2022-05-16 | Stop reason: SDUPTHER

## 2022-05-16 RX ORDER — CYCLOBENZAPRINE HCL 10 MG
10 TABLET ORAL 3 TIMES DAILY PRN
Qty: 15 TABLET | Refills: 0 | Status: SHIPPED | OUTPATIENT
Start: 2022-05-16 | End: 2022-05-21

## 2022-05-16 RX ORDER — LORAZEPAM 0.5 MG/1
1 TABLET ORAL ONCE
Status: COMPLETED | OUTPATIENT
Start: 2022-05-16 | End: 2022-05-16

## 2022-05-16 RX ADMIN — LORAZEPAM 1 MG: 0.5 TABLET ORAL at 20:18

## 2022-05-16 ASSESSMENT — ENCOUNTER SYMPTOMS
ABDOMINAL PAIN: 0
NAUSEA: 0
SHORTNESS OF BREATH: 0
VOMITING: 0
BACK PAIN: 1
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
## 2022-05-16 NOTE — Clinical Note
Jayla Sanders was seen and treated in our emergency department on 5/16/2022. She may return to work on 05/17/2022. If you have any questions or concerns, please don't hesitate to call.       Clement Escobedo MD
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
## 2022-05-20 LAB
EKG ATRIAL RATE: 76 BPM
EKG P-R INTERVAL: 126 MS
EKG Q-T INTERVAL: 410 MS
EKG QRS DURATION: 118 MS
EKG QTC CALCULATION (BAZETT): 461 MS
EKG R AXIS: -77 DEGREES
EKG T AXIS: -88 DEGREES
EKG VENTRICULAR RATE: 76 BPM

## 2022-05-20 PROCEDURE — 93010 ELECTROCARDIOGRAM REPORT: CPT | Performed by: INTERNAL MEDICINE

## 2022-06-10 ENCOUNTER — NURSE TRIAGE (OUTPATIENT)
Dept: OTHER | Facility: CLINIC | Age: 57
End: 2022-06-10
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
## 2023-07-14 ENCOUNTER — OFFICE VISIT (OUTPATIENT)
Dept: INTERNAL MEDICINE | Age: 58
End: 2023-07-14
Payer: OTHER MISCELLANEOUS

## 2023-07-14 VITALS
HEART RATE: 69 BPM | DIASTOLIC BLOOD PRESSURE: 79 MMHG | SYSTOLIC BLOOD PRESSURE: 129 MMHG | OXYGEN SATURATION: 98 % | WEIGHT: 224 LBS | BODY MASS INDEX: 38.24 KG/M2 | TEMPERATURE: 97.2 F | HEIGHT: 64 IN

## 2023-07-14 DIAGNOSIS — L97.521: ICD-10-CM

## 2023-07-14 DIAGNOSIS — G89.29 CHRONIC BILATERAL LOW BACK PAIN WITHOUT SCIATICA: Primary | ICD-10-CM

## 2023-07-14 DIAGNOSIS — Z13.1 DIABETES MELLITUS SCREENING: ICD-10-CM

## 2023-07-14 DIAGNOSIS — M72.2 PLANTAR FASCIITIS: ICD-10-CM

## 2023-07-14 DIAGNOSIS — D68.51 HETEROZYGOUS FACTOR V LEIDEN MUTATION (HCC): Chronic | ICD-10-CM

## 2023-07-14 DIAGNOSIS — Z13.220 LIPID SCREENING: ICD-10-CM

## 2023-07-14 DIAGNOSIS — R42 VERTIGO: ICD-10-CM

## 2023-07-14 DIAGNOSIS — K21.9 GASTROESOPHAGEAL REFLUX DISEASE WITHOUT ESOPHAGITIS: ICD-10-CM

## 2023-07-14 DIAGNOSIS — S39.012A STRAIN OF LUMBAR PARASPINAL MUSCLE, INITIAL ENCOUNTER: ICD-10-CM

## 2023-07-14 DIAGNOSIS — M54.50 CHRONIC BILATERAL LOW BACK PAIN WITHOUT SCIATICA: Primary | ICD-10-CM

## 2023-07-14 DIAGNOSIS — E55.9 VITAMIN D DEFICIENCY: ICD-10-CM

## 2023-07-14 DIAGNOSIS — S16.1XXA STRAIN OF NECK MUSCLE, INITIAL ENCOUNTER: ICD-10-CM

## 2023-07-14 LAB — HBA1C MFR BLD: 5.8 %

## 2023-07-14 PROCEDURE — G8417 CALC BMI ABV UP PARAM F/U: HCPCS | Performed by: STUDENT IN AN ORGANIZED HEALTH CARE EDUCATION/TRAINING PROGRAM

## 2023-07-14 PROCEDURE — 1036F TOBACCO NON-USER: CPT | Performed by: STUDENT IN AN ORGANIZED HEALTH CARE EDUCATION/TRAINING PROGRAM

## 2023-07-14 PROCEDURE — 83037 HB GLYCOSYLATED A1C HOME DEV: CPT | Performed by: STUDENT IN AN ORGANIZED HEALTH CARE EDUCATION/TRAINING PROGRAM

## 2023-07-14 PROCEDURE — 99213 OFFICE O/P EST LOW 20 MIN: CPT | Performed by: STUDENT IN AN ORGANIZED HEALTH CARE EDUCATION/TRAINING PROGRAM

## 2023-07-14 PROCEDURE — 3017F COLORECTAL CA SCREEN DOC REV: CPT | Performed by: STUDENT IN AN ORGANIZED HEALTH CARE EDUCATION/TRAINING PROGRAM

## 2023-07-14 PROCEDURE — G8427 DOCREV CUR MEDS BY ELIG CLIN: HCPCS | Performed by: STUDENT IN AN ORGANIZED HEALTH CARE EDUCATION/TRAINING PROGRAM

## 2023-07-14 RX ORDER — LIDOCAINE 4 G/G
1 PATCH TOPICAL DAILY
Qty: 30 PATCH | Refills: 5 | Status: SHIPPED | OUTPATIENT
Start: 2023-07-14 | End: 2024-01-10

## 2023-07-14 RX ORDER — MECLIZINE HYDROCHLORIDE 25 MG/1
25 TABLET ORAL 3 TIMES DAILY PRN
Qty: 30 TABLET | Refills: 5 | Status: SHIPPED | OUTPATIENT
Start: 2023-07-14 | End: 2023-09-12

## 2023-07-14 RX ORDER — MULTIVIT-MIN/IRON FUM/FOLIC AC 7.5 MG-4
1 TABLET ORAL DAILY
Qty: 30 TABLET | Refills: 5 | Status: SHIPPED | OUTPATIENT
Start: 2023-07-14

## 2023-07-14 RX ORDER — IBUPROFEN 600 MG/1
600 TABLET ORAL 4 TIMES DAILY PRN
Qty: 40 TABLET | Refills: 1 | Status: SHIPPED | OUTPATIENT
Start: 2023-07-14 | End: 2023-08-03

## 2023-07-14 RX ORDER — FAMOTIDINE 20 MG/1
20 TABLET, FILM COATED ORAL 2 TIMES DAILY PRN
Qty: 60 TABLET | Refills: 3 | Status: SHIPPED | OUTPATIENT
Start: 2023-07-14

## 2023-07-14 SDOH — ECONOMIC STABILITY: FOOD INSECURITY: WITHIN THE PAST 12 MONTHS, THE FOOD YOU BOUGHT JUST DIDN'T LAST AND YOU DIDN'T HAVE MONEY TO GET MORE.: NEVER TRUE

## 2023-07-14 SDOH — ECONOMIC STABILITY: HOUSING INSECURITY
IN THE LAST 12 MONTHS, WAS THERE A TIME WHEN YOU DID NOT HAVE A STEADY PLACE TO SLEEP OR SLEPT IN A SHELTER (INCLUDING NOW)?: YES

## 2023-07-14 SDOH — ECONOMIC STABILITY: INCOME INSECURITY: HOW HARD IS IT FOR YOU TO PAY FOR THE VERY BASICS LIKE FOOD, HOUSING, MEDICAL CARE, AND HEATING?: NOT VERY HARD

## 2023-07-14 SDOH — ECONOMIC STABILITY: FOOD INSECURITY: WITHIN THE PAST 12 MONTHS, YOU WORRIED THAT YOUR FOOD WOULD RUN OUT BEFORE YOU GOT MONEY TO BUY MORE.: NEVER TRUE

## 2023-07-14 ASSESSMENT — PATIENT HEALTH QUESTIONNAIRE - PHQ9
SUM OF ALL RESPONSES TO PHQ QUESTIONS 1-9: 0
SUM OF ALL RESPONSES TO PHQ QUESTIONS 1-9: 0
2. FEELING DOWN, DEPRESSED OR HOPELESS: 0
1. LITTLE INTEREST OR PLEASURE IN DOING THINGS: 0
SUM OF ALL RESPONSES TO PHQ9 QUESTIONS 1 & 2: 0
SUM OF ALL RESPONSES TO PHQ QUESTIONS 1-9: 0
SUM OF ALL RESPONSES TO PHQ QUESTIONS 1-9: 0

## 2023-07-14 ASSESSMENT — ENCOUNTER SYMPTOMS
ABDOMINAL PAIN: 0
CHEST TIGHTNESS: 0
BLOOD IN STOOL: 0
SHORTNESS OF BREATH: 0

## 2023-07-31 ENCOUNTER — TELEPHONE (OUTPATIENT)
Dept: ONCOLOGY | Age: 58
End: 2023-07-31

## 2023-07-31 NOTE — TELEPHONE ENCOUNTER
Spoke with pt to schedule appt pt states she will call office back later .    Electronically signed by Gloria Fried MA on 7/31/2023 at 10:54 AM

## 2023-08-17 NOTE — TELEPHONE ENCOUNTER
"EMERGENCY DEPARTMENT ATTENDING NOTE    Patient Name: Saulo Shepard    Chief Complaint   Patient presents with    Chemical Exposure       PATIENT PRESENTATION:  Saulo Shepard is a very pleasant 47 y.o. male with no significant past medical history presents emerged department due to an antifreeze exposure.    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  He was wearing eye protection he states it went all of his face but he definitely did not actually get any in his eyes.  He got a tiny bit his mouth thinks he might of swallowed it.  He washed his face extensively in his mouth extensively following.  Denies any eye pain or vision changes.  States that it occurred about 2 hours prior arrival.  Since he has had some chest pain described as retrosternal squeezing nature.  Patient is non-smoker no family history myocardial infarction.  No history of hypertension hyperlipidemia.  Other than the hernia he really has no medical problems.  Also Dors some mild shortness of breath since this episode.      PHYSICAL EXAM:   VS: /76   Pulse 78   Temp 98 øF (36.7 øC)   Resp 20   Ht 185.4 cm (73\")   Wt 95.3 kg (210 lb)   SpO2 98%   BMI 27.71 kg/mý   GENERAL: Well-appearing middle-age man sitting up stretcher no acute distress; well-nourished, well-developed, awake, alert, no acute distress, nontoxic appearing, comfortable  EYES: PERRL, sclerae anicteric, extraocular movements grossly intact, symmetric lids  EARS, NOSE, MOUTH, THROAT: atraumatic external nose and ears, moist mucous membranes  NECK: symmetric, trachea midline  RESPIRATORY: unlabored respiratory effort, clear to auscultation bilaterally, good air movement; no inspiratory wheezing; no stridor  CARDIOVASCULAR: no murmurs, peripheral pulses 2+ and equal in all extremities  GI: soft, nontender, nondistended  MUSCULOSKELETAL/EXTREMITIES: " Pt see in office on 12/8/2020 for this matter. extremities without obvious deformity  SKIN: warm and dry with no obvious rashes  NEUROLOGIC: moving all 4 extremities symmetrically, CN II-XII grossly intact  PSYCHIATRIC: alert, pleasant and cooperative. Appropriate mood and affect.      MEDICAL DECISION MAKING:    Saulo Shepard is a 47 y.o. male to emergency room after antifreeze splashed in his face most complaining of new onset chest pain and shortness of breath.    Differential Diagnosis Considered: Chemical pneumonitis, antifreeze exposure, anxiety    Labs Ordered:  Labs Reviewed   CBC WITH AUTO DIFFERENTIAL - Abnormal; Notable for the following components:       Result Value    WBC 14.13 (*)     All other components within normal limits   COMPREHENSIVE METABOLIC PANEL - Abnormal; Notable for the following components:    Calcium 10.9 (*)     All other components within normal limits    Narrative:     GFR Normal >60  Chronic Kidney Disease <60  Kidney Failure <15     TROPONIN - Normal    Narrative:     High Sensitive Troponin T Reference Range:  <10.0 ng/L- Negative Female for AMI  <15.0 ng/L- Negative Male for AMI  >=10 - Abnormal Female indicating possible myocardial injury.  >=15 - Abnormal Male indicating possible myocardial injury.   Clinicians would have to utilize clinical acumen, EKG, Troponin, and serial changes to determine if it is an Acute Myocardial Infarction or myocardial injury due to an underlying chronic condition.        BNP (IN-HOUSE) - Normal    Narrative:     Among patients with dyspnea, NT-proBNP is highly sensitive for the detection of acute congestive heart failure. In addition NT-proBNP of <300 pg/ml effectively rules out acute congestive heart failure with 99% negative predictive value.     MANUAL DIFFERENTIAL   HIGH SENSITIVITIY TROPONIN T 2HR   CBC AND DIFFERENTIAL    Narrative:     The following orders were created for panel order CBC & Differential.  Procedure                               Abnormality         Status                      ---------                               -----------         ------                     CBC Auto Differential[[id number]]        Abnormal            Final result                 Please view results for these tests on the individual orders.        Imaging Ordered:   XR Chest 1 View   ED Interpretation   No evidence of focal consolidation or pneumonia.          Internal chart review:   Past Medical History:   Diagnosis Date    Anxiety     Back pain     Fracture of lumbar spine 2002 and 2015    lower lumbar 2002; L1 2015    Head injury     Injury of back     Neck pain     Overweight (BMI 25.0-29.9)        Past Surgical History:   Procedure Laterality Date    FACIAL FRACTURE SURGERY      INCISION AND DRAINAGE LEG Right 12/1/2018    Procedure: INCISION AND DRAINAGE OF RIGHT UPPER INNER THIGH, PLACEMENT OF WOUND VAC;  Surgeon: René Dallas MD;  Location: [facility] OR;  Service: General    KNEE SURGERY Right 09/2021    SPLENECTOMY         No Known Allergies      Current Facility-Administered Medications:     [COMPLETED] Insert Peripheral IV, , , Once **AND** sodium chloride 0.9 % flush 10 mL, 10 mL, Intravenous, PRN, Nabeel Huff MD    Current Outpatient Medications:     diclofenac (VOLTAREN) 75 MG EC tablet, Take 1 tablet by mouth 2 (Two) Times a Day As Needed (For pain and back pain) for up to 30 days., Disp: 60 tablet, Rfl: 0    fluticasone (FLONASE) 50 MCG/ACT nasal spray, 2 sprays into the nostril(s) as directed by provider Daily., Disp: 9.9 mL, Rfl: 0    HYDROcodone-acetaminophen (NORCO) 5-325 MG per tablet, Take 1 tablet by mouth At Night As Needed for Severe Pain., Disp: 20 tablet, Rfl: 0    ondansetron ODT (ZOFRAN-ODT) 4 MG disintegrating tablet, Place 1 tablet on the tongue Every 6 (Six) Hours As Needed for Nausea or Vomiting., Disp: 40 tablet, Rfl: 0    My EKG interpretation: Normal sinus with a rate of 65.  No ST elevations ST depressions or T wave inversions.    My lab interpretation: Negative  high sensitive opponent.  Normal BNP.  CBC with slight leukocytosis which patient has had previously 14 from 13.  CMP otherwise unremarkable slight increase calcium at 10.9.    My imaging interpretation: Chest x-ray with no evidence of focal consolidation or pneumonia.    Decision rules/scores evaluated: HEART score 1.     Discussed with: [place] poison control, who stated that unlikely to have ingested greater than 9mL so recommended no additional workup. Would not cause chemical pneumonitis from his exposure.  If he had called contacted poison control per report from syncopal control they would recommend no presentation to the emergency department.    ED Course and Re-evaluation: 46yo M department after a splash exposure to antifreeze while wearing ear protection.  Patient is already excessively washed his face and mouth when incident.  His eyes are anicteric he is extremely well-appearing.  Patient complaining of chest pain following exposure to aspiration for panic attack but given patient's age proceed cardiac work-up.  Discussed case with [place] poison control there is no indication for any additional laboratory work-up there is no chance of chemical pneumonitis from this exposure and this is highly unlikely to be toxic exposure.  Given patient reporting chest pain still pursue laboratory work-up.  His EKG is nonischemic and reassuring.  Discharge course quite low so I highly doubt acute coronary syndrome he deafly no has no evidence of STEMI.  Negative high since troponin which is not consistent with NSTEMI based on troponin and again highly doubt ACS with a heart score of 1 solely due to age.  No indication for repeat troponin.  Patient's chest x-ray shows no evidence of full consolidation pneumonia.  Patient was observed with reassuring vital signs and patient remaining well-appearing throughout his stay in the emergency department.  Patient was discharged home with plan to return to the emergency room  medially for worsening symptoms and otherwise follow-up with his primary care provider early next week.      ED Diagnosis:  Chemical exposure; Acute chest pain    Disposition: to home  Follow up plan: PCP follow up within 2 days, return to ED immediately if symptoms worsen        Signed:  Nabeel Huff MD  Emergency Medicine Physician    Please note that portions of this note were completed with a voice recognition program.      Nabeel Huff MD  08/17/23 0423

## 2023-09-29 ENCOUNTER — TELEPHONE (OUTPATIENT)
Dept: INTERNAL MEDICINE | Age: 58
End: 2023-09-29

## 2023-09-29 NOTE — TELEPHONE ENCOUNTER
Called pt to reschedule 10/20/23- [facility]